# Patient Record
Sex: MALE | Race: WHITE | Employment: FULL TIME | URBAN - METROPOLITAN AREA
[De-identification: names, ages, dates, MRNs, and addresses within clinical notes are randomized per-mention and may not be internally consistent; named-entity substitution may affect disease eponyms.]

---

## 2017-01-18 ENCOUNTER — OFFICE VISIT (OUTPATIENT)
Dept: PAIN MANAGEMENT | Age: 42
End: 2017-01-18

## 2017-01-18 VITALS
DIASTOLIC BLOOD PRESSURE: 76 MMHG | HEART RATE: 81 BPM | WEIGHT: 215 LBS | SYSTOLIC BLOOD PRESSURE: 127 MMHG | BODY MASS INDEX: 28.37 KG/M2

## 2017-01-18 DIAGNOSIS — M75.40 IMPINGEMENT SYNDROME OF SHOULDER, UNSPECIFIED LATERALITY: ICD-10-CM

## 2017-01-18 DIAGNOSIS — S13.9XXA SPRAIN OF NECK, INITIAL ENCOUNTER: ICD-10-CM

## 2017-01-18 LAB
A/G RATIO: 1.4 (ref 1.1–2.2)
ALBUMIN SERPL-MCNC: 4.5 G/DL (ref 3.4–5)
ALP BLD-CCNC: 92 U/L (ref 40–129)
ALT SERPL-CCNC: 33 U/L (ref 10–40)
ANION GAP SERPL CALCULATED.3IONS-SCNC: 12 MMOL/L (ref 3–16)
AST SERPL-CCNC: 29 U/L (ref 15–37)
BILIRUB SERPL-MCNC: 0.4 MG/DL (ref 0–1)
BUN BLDV-MCNC: 12 MG/DL (ref 7–20)
CALCIUM SERPL-MCNC: 9.3 MG/DL (ref 8.3–10.6)
CHLORIDE BLD-SCNC: 99 MMOL/L (ref 99–110)
CO2: 28 MMOL/L (ref 21–32)
CREAT SERPL-MCNC: 0.9 MG/DL (ref 0.9–1.3)
GFR AFRICAN AMERICAN: >60
GFR NON-AFRICAN AMERICAN: >60
GLOBULIN: 3.3 G/DL
GLUCOSE BLD-MCNC: 126 MG/DL (ref 70–99)
HCT VFR BLD CALC: 44.7 % (ref 40.5–52.5)
HEMOGLOBIN: 15.3 G/DL (ref 13.5–17.5)
MCH RBC QN AUTO: 31.1 PG (ref 26–34)
MCHC RBC AUTO-ENTMCNC: 34.3 G/DL (ref 31–36)
MCV RBC AUTO: 90.8 FL (ref 80–100)
PDW BLD-RTO: 13 % (ref 12.4–15.4)
PLATELET # BLD: 327 K/UL (ref 135–450)
PMV BLD AUTO: 8 FL (ref 5–10.5)
POTASSIUM SERPL-SCNC: 4.3 MMOL/L (ref 3.5–5.1)
RBC # BLD: 4.92 M/UL (ref 4.2–5.9)
SODIUM BLD-SCNC: 139 MMOL/L (ref 136–145)
TOTAL PROTEIN: 7.8 G/DL (ref 6.4–8.2)
WBC # BLD: 8.1 K/UL (ref 4–11)

## 2017-01-18 PROCEDURE — 99213 OFFICE O/P EST LOW 20 MIN: CPT | Performed by: INTERNAL MEDICINE

## 2017-01-18 RX ORDER — HYDROCODONE BITARTRATE AND ACETAMINOPHEN 5; 300 MG/1; MG/1
TABLET ORAL
Qty: 70 TABLET | Refills: 0 | Status: SHIPPED | OUTPATIENT
Start: 2017-01-18 | End: 2017-03-01 | Stop reason: SDUPTHER

## 2017-03-01 ENCOUNTER — OFFICE VISIT (OUTPATIENT)
Dept: PAIN MANAGEMENT | Age: 42
End: 2017-03-01

## 2017-03-01 VITALS
SYSTOLIC BLOOD PRESSURE: 124 MMHG | HEART RATE: 71 BPM | WEIGHT: 207 LBS | BODY MASS INDEX: 27.31 KG/M2 | DIASTOLIC BLOOD PRESSURE: 86 MMHG

## 2017-03-01 DIAGNOSIS — S13.9XXA SPRAIN OF NECK, INITIAL ENCOUNTER: ICD-10-CM

## 2017-03-01 DIAGNOSIS — M75.40 IMPINGEMENT SYNDROME OF SHOULDER, UNSPECIFIED LATERALITY: ICD-10-CM

## 2017-03-01 PROCEDURE — 99213 OFFICE O/P EST LOW 20 MIN: CPT | Performed by: INTERNAL MEDICINE

## 2017-03-01 RX ORDER — HYDROCODONE BITARTRATE AND ACETAMINOPHEN 5; 300 MG/1; MG/1
TABLET ORAL
Qty: 70 TABLET | Refills: 0 | Status: SHIPPED | OUTPATIENT
Start: 2017-03-01 | End: 2017-04-12 | Stop reason: SDUPTHER

## 2017-04-12 ENCOUNTER — OFFICE VISIT (OUTPATIENT)
Dept: PAIN MANAGEMENT | Age: 42
End: 2017-04-12

## 2017-04-12 VITALS
SYSTOLIC BLOOD PRESSURE: 112 MMHG | HEART RATE: 82 BPM | BODY MASS INDEX: 28.42 KG/M2 | WEIGHT: 215.4 LBS | DIASTOLIC BLOOD PRESSURE: 77 MMHG

## 2017-04-12 DIAGNOSIS — M75.40 IMPINGEMENT SYNDROME OF SHOULDER, UNSPECIFIED LATERALITY: ICD-10-CM

## 2017-04-12 DIAGNOSIS — S13.9XXA SPRAIN OF NECK, INITIAL ENCOUNTER: ICD-10-CM

## 2017-04-12 PROCEDURE — 99213 OFFICE O/P EST LOW 20 MIN: CPT | Performed by: INTERNAL MEDICINE

## 2017-04-12 RX ORDER — HYDROCODONE BITARTRATE AND ACETAMINOPHEN 5; 300 MG/1; MG/1
TABLET ORAL
Qty: 70 TABLET | Refills: 0 | Status: SHIPPED | OUTPATIENT
Start: 2017-04-12 | End: 2017-05-31 | Stop reason: SDUPTHER

## 2017-04-20 ENCOUNTER — TELEPHONE (OUTPATIENT)
Dept: PAIN MANAGEMENT | Age: 42
End: 2017-04-20

## 2017-05-31 ENCOUNTER — OFFICE VISIT (OUTPATIENT)
Dept: PAIN MANAGEMENT | Age: 42
End: 2017-05-31

## 2017-05-31 VITALS
DIASTOLIC BLOOD PRESSURE: 91 MMHG | HEART RATE: 83 BPM | WEIGHT: 219 LBS | SYSTOLIC BLOOD PRESSURE: 135 MMHG | BODY MASS INDEX: 28.89 KG/M2

## 2017-05-31 DIAGNOSIS — S13.9XXA SPRAIN OF NECK, INITIAL ENCOUNTER: ICD-10-CM

## 2017-05-31 DIAGNOSIS — M75.40 IMPINGEMENT SYNDROME OF SHOULDER, UNSPECIFIED LATERALITY: ICD-10-CM

## 2017-05-31 PROCEDURE — 99213 OFFICE O/P EST LOW 20 MIN: CPT | Performed by: INTERNAL MEDICINE

## 2017-05-31 RX ORDER — HYDROCODONE BITARTRATE AND ACETAMINOPHEN 5; 300 MG/1; MG/1
TABLET ORAL
Qty: 70 TABLET | Refills: 0 | Status: SHIPPED | OUTPATIENT
Start: 2017-05-31 | End: 2017-07-19 | Stop reason: SDUPTHER

## 2017-07-19 ENCOUNTER — OFFICE VISIT (OUTPATIENT)
Dept: PAIN MANAGEMENT | Age: 42
End: 2017-07-19

## 2017-07-19 VITALS
DIASTOLIC BLOOD PRESSURE: 81 MMHG | WEIGHT: 220 LBS | SYSTOLIC BLOOD PRESSURE: 114 MMHG | BODY MASS INDEX: 29.03 KG/M2 | HEART RATE: 88 BPM

## 2017-07-19 DIAGNOSIS — M75.40 IMPINGEMENT SYNDROME OF SHOULDER, UNSPECIFIED LATERALITY: ICD-10-CM

## 2017-07-19 DIAGNOSIS — S13.9XXA SPRAIN OF NECK, INITIAL ENCOUNTER: ICD-10-CM

## 2017-07-19 PROCEDURE — 99213 OFFICE O/P EST LOW 20 MIN: CPT | Performed by: INTERNAL MEDICINE

## 2017-07-19 RX ORDER — HYDROCODONE BITARTRATE AND ACETAMINOPHEN 5; 300 MG/1; MG/1
TABLET ORAL
Qty: 70 TABLET | Refills: 0 | Status: SHIPPED | OUTPATIENT
Start: 2017-07-19 | End: 2017-09-06 | Stop reason: SDUPTHER

## 2017-07-21 ENCOUNTER — TELEPHONE (OUTPATIENT)
Dept: PAIN MANAGEMENT | Age: 42
End: 2017-07-21

## 2017-09-06 ENCOUNTER — OFFICE VISIT (OUTPATIENT)
Dept: PAIN MANAGEMENT | Age: 42
End: 2017-09-06

## 2017-09-06 VITALS
WEIGHT: 224 LBS | BODY MASS INDEX: 29.55 KG/M2 | HEART RATE: 73 BPM | DIASTOLIC BLOOD PRESSURE: 75 MMHG | SYSTOLIC BLOOD PRESSURE: 114 MMHG

## 2017-09-06 DIAGNOSIS — M75.40 IMPINGEMENT SYNDROME OF SHOULDER, UNSPECIFIED LATERALITY: ICD-10-CM

## 2017-09-06 DIAGNOSIS — S13.9XXA SPRAIN OF NECK, INITIAL ENCOUNTER: ICD-10-CM

## 2017-09-06 PROCEDURE — 99213 OFFICE O/P EST LOW 20 MIN: CPT | Performed by: INTERNAL MEDICINE

## 2017-09-06 RX ORDER — HYDROCODONE BITARTRATE AND ACETAMINOPHEN 5; 300 MG/1; MG/1
TABLET ORAL
Qty: 70 TABLET | Refills: 0 | Status: SHIPPED | OUTPATIENT
Start: 2017-09-06 | End: 2017-10-25 | Stop reason: SDUPTHER

## 2017-10-25 ENCOUNTER — OFFICE VISIT (OUTPATIENT)
Dept: PAIN MANAGEMENT | Age: 42
End: 2017-10-25

## 2017-10-25 VITALS
WEIGHT: 224 LBS | BODY MASS INDEX: 29.55 KG/M2 | HEART RATE: 72 BPM | SYSTOLIC BLOOD PRESSURE: 123 MMHG | DIASTOLIC BLOOD PRESSURE: 84 MMHG

## 2017-10-25 DIAGNOSIS — M75.40 IMPINGEMENT SYNDROME OF SHOULDER, UNSPECIFIED LATERALITY: ICD-10-CM

## 2017-10-25 DIAGNOSIS — S13.9XXA SPRAIN OF NECK, INITIAL ENCOUNTER: ICD-10-CM

## 2017-10-25 PROCEDURE — 99213 OFFICE O/P EST LOW 20 MIN: CPT | Performed by: INTERNAL MEDICINE

## 2017-10-25 RX ORDER — HYDROCODONE BITARTRATE AND ACETAMINOPHEN 5; 300 MG/1; MG/1
TABLET ORAL
Qty: 70 TABLET | Refills: 0 | Status: SHIPPED | OUTPATIENT
Start: 2017-10-25 | End: 2017-12-13 | Stop reason: SDUPTHER

## 2017-10-25 NOTE — PROGRESS NOTES
Jono Paredes   1975  [id number]    HISTORY OF PRESENT ILLNESS:  Mr. Shankar Downs is a 38 y.o. male returns for a follow up visit for multiple medical problems. His current presenting problems are   1. [facility] Sprain of neck, initial encounter    2. [facility] Impingement syndrome of shoulder, unspecified laterality    . As per information/history obtained from the PADT(patient assessment and documentation tool) - He complains of pain in the neck, upper back, mid back and lower back with radiation to the shoulders Right, arms Right, elbows Right and buttocks He rates the pain 6/10 and describes it as sharp, aching, numbness. Pain is made worse by: movement, walking, standing, bending, lifting. Current treatment regimen has helped relieve about 80% of the pain. He denies side effects from the current pain regimen. Patient reports that since the last follow up visit the physical functioning is better, family/social relationships are unchanged, mood is unchanged and sleep patterns are unchanged, and that the overall functioning is better. Patient denies neurological bowel or bladder. Patient denies misusing/abusing his narcotic pain medications or using any illegal drugs. There are No indicators for possible drug abuse, addiction or diversion problems. Upon obtaining the medical history from Mr. Shankar Downs regarding today's office visit for his presenting problems, Patient states his pain has been baseline. He states it hurts while doing his work, has a rough job. He says his neck and shoulder hurts the most. Mr. Shankar Downs mentions using Xodol PRN. Patient denies any constipation symptoms. ALLERGIES: Patients list of allergies were reviewed     MEDICATIONS: Mr. Shankar Downs list of medications were reviewed. His current medications are   Outpatient Medications Prior to Visit   Medication Sig Dispense Refill    HYDROcodone-acetaminophen (XODOL) 5-300 MG TABS Take  1 tablet Po Q6H max 1-2 per day.  70 tablet 0    esomeprazole (NEXIUM) 40 MG capsule Take 40 mg by mouth every morning (before breakfast). No facility-administered medications prior to visit. SOCIAL/FAMILY/PAST MEDICAL HISTORY: Mr. Caridad oJe, family and past medical history was reviewed. REVIEW OF SYSTEMS:    Respiratory: Negative for apnea, chest tightness and shortness of breath or change in baseline breathing. Gastrointestinal: Negative for nausea, vomiting, abdominal pain, diarrhea, constipation, blood in stool and abdominal distention. PHYSICAL EXAM:   Nursing note and vitals reviewed. /84 (Site: Left Arm, Position: Sitting)   Pulse 72   Wt 224 lb (101.6 kg)   BMI 29.55 kg/m²   Constitutional: He appears well-developed and well-nourished. No acute distress. Skin: Skin is warm and dry, good turgor. No rash noted. He is not diaphoretic. Cardiovascular: Normal rate, regular rhythm, normal heart sounds, and does not have murmur. Pulmonary/Chest: Effort normal. No respiratory distress. He does not have wheezes in the lung fields. He has no rales. Neurological/Psychiatric:He is alert and oriented to person, place, and time. Coordination is  normal. His mood isAppropriate and affect is Flat/blunted . IMPRESSION:   1. [facility] Sprain of neck, initial encounter    2. [facility] Impingement syndrome of shoulder, unspecified laterality        PLAN:  Informed verbal consent was obtained  -Continue with current regimen   -ROM/stretching exercises for back   -He was advised to increase fluids ( 5-7  glasses of fluid daily), limit caffeine, avoid cheese products, increase dietary fiber, increase activity and exercise as tolerated and relax regularly and enjoy meals  -Neck postural exercises as advised   -Using Robaxin very seldom      Current Outpatient Prescriptions   Medication Sig Dispense Refill    HYDROcodone-acetaminophen (XODOL) 5-300 MG TABS Take  1 tablet Po Q6H max 1-2 per day.  70 tablet 0    esomeprazole (NEXIUM) 40 MG capsule been some errors made inadvertently. Thank you for allowing me to participate in the care of this patient. Michae Halsted, MD.    Cc: Antonio Horton am scribing for and in the presence of Dr. Michae Halsted.    10/25/17  1:41 PM  ELIZABETH Samano, Dr. Michae Halsted, personally performed the services described in this documentation as scribed by   Geeta Stephens MA in my presence and it is both accurate and complete

## 2017-12-13 ENCOUNTER — OFFICE VISIT (OUTPATIENT)
Dept: PAIN MANAGEMENT | Age: 42
End: 2017-12-13

## 2017-12-13 VITALS
WEIGHT: 224 LBS | DIASTOLIC BLOOD PRESSURE: 90 MMHG | BODY MASS INDEX: 29.55 KG/M2 | SYSTOLIC BLOOD PRESSURE: 139 MMHG | HEART RATE: 88 BPM

## 2017-12-13 DIAGNOSIS — S13.9XXA NECK SPRAIN, INITIAL ENCOUNTER: ICD-10-CM

## 2017-12-13 DIAGNOSIS — S13.9XXA SPRAIN OF NECK, INITIAL ENCOUNTER: ICD-10-CM

## 2017-12-13 DIAGNOSIS — M75.40 IMPINGEMENT SYNDROME OF SHOULDER, UNSPECIFIED LATERALITY: ICD-10-CM

## 2017-12-13 DIAGNOSIS — M75.40 IMPINGEMENT SYNDROME OF SHOULDER REGION, UNSPECIFIED LATERALITY: ICD-10-CM

## 2017-12-13 PROCEDURE — 99213 OFFICE O/P EST LOW 20 MIN: CPT | Performed by: INTERNAL MEDICINE

## 2017-12-13 RX ORDER — HYDROCODONE BITARTRATE AND ACETAMINOPHEN 5; 300 MG/1; MG/1
TABLET ORAL
Qty: 70 TABLET | Refills: 0 | Status: SHIPPED | OUTPATIENT
Start: 2017-12-13 | End: 2018-01-31 | Stop reason: SDUPTHER

## 2017-12-13 NOTE — PROGRESS NOTES
Tila Hernández   1975  [id number]    HISTORY OF PRESENT ILLNESS:  Mr. Iris Sidhu is a 38 y.o. male returns for a follow up visit for multiple medical problems. His current presenting problems are   1. Neck sprain, initial encounter    2. Impingement syndrome of shoulder region, unspecified laterality    . As per information/history obtained from the PADT(patient assessment and documentation tool) - He complains of pain in the neck, upper back, mid back and lower back with radiation to the shoulders Right and arms Right He rates the pain 6/10 and describes it as sharp, aching, burning, numbness. Pain is made worse by: movement, walking, standing, sitting, bending, lifting. Current treatment regimen has helped relieve about 70% of the pain. He denies side effects from the current pain regimen. Patient reports that since the last follow up visit the physical functioning is better, family/social relationships are unchanged, mood is unchanged and sleep patterns are unchanged, and that the overall functioning is better. Patient denies neurological bowel or bladder. Patient denies misusing/abusing his narcotic pain medications or using any illegal drugs. There are No indicators for possible drug abuse, addiction or diversion problems. Upon obtaining the medical history from Mr. Iris Sidhu regarding today's office visit for his presenting problems, Patient states his pain has been tolerable with the medications. He says he has been working full time. He mentions he is using Xodol as needed. He reports he has not been using much of the Robaxin, has not used it the last 2 years. He complains his neck hurts the most.     ALLERGIES: Patients list of allergies were reviewed     MEDICATIONS: Mr. rIis Sidhu list of medications were reviewed. His current medications are   Outpatient Medications Prior to Visit   Medication Sig Dispense Refill    HYDROcodone-acetaminophen (XODOL) 5-300 MG TABS Take  1 tablet Po Q6H max 1-2 per day. 70 tablet 0    esomeprazole (NEXIUM) 40 MG capsule Take 40 mg by mouth every morning (before breakfast). No facility-administered medications prior to visit. SOCIAL/FAMILY/PAST MEDICAL HISTORY: Mr. Charmaine Thurman, family and past medical history was reviewed. REVIEW OF SYSTEMS:    Respiratory: Negative for apnea, chest tightness and shortness of breath or change in baseline breathing. Gastrointestinal: Negative for nausea, vomiting, abdominal pain, diarrhea, constipation, blood in stool and abdominal distention. PHYSICAL EXAM:   Nursing note and vitals reviewed. BP (!) 139/90   Pulse 88   Wt 224 lb (101.6 kg)   BMI 29.55 kg/m²   Constitutional: He appears well-developed and well-nourished. No acute distress. Skin: Skin is warm and dry, good turgor. No rash noted. He is not diaphoretic. Cardiovascular: Normal rate, regular rhythm, normal heart sounds, and does not have murmur. Pulmonary/Chest: Effort normal. No respiratory distress. He does not have wheezes in the lung fields. He has no rales. Neurological/Psychiatric:He is alert and oriented to person, place, and time. Coordination is  normal. His mood isAppropriate and affect is Flat/blunted . IMPRESSION:   1. Neck sprain, initial encounter    2. Impingement syndrome of shoulder region, unspecified laterality        PLAN:  Informed verbal consent was obtained  -[place] was obtained and reviewed  for the last one year and no indicators of drug misuse  were found. Any other controlled substance prescriptions  seen on the record have been accounted for, I am aware of the patient receiving these medications. Mariel Altamirano  [place] record will be rechecked as part of office protocol.    -Continue with Norco as needed 1-2 per day   -Discontinue with Robaxin, not using it   -He was advised to increase fluids ( 5-7  glasses of fluid daily), limit caffeine, avoid cheese products, increase dietary fiber, increase activity and exercise as tolerated and relax regularly and enjoy meals   -Neck postural exercises as advised   Current Outpatient Prescriptions   Medication Sig Dispense Refill    HYDROcodone-acetaminophen (XODOL) 5-300 MG TABS Take  1 tablet Po Q6H max 1-2 per day. 70 tablet 0    esomeprazole (NEXIUM) 40 MG capsule Take 40 mg by mouth every morning (before breakfast). No current facility-administered medications for this visit. I will continue his current medication regimen  which is part of the above treatment schedule. It has been helping with Mr. Lanette Styles chronic  medical problems which for this visit include:   Diagnoses of Neck sprain, initial encounter and Impingement syndrome of shoulder region, unspecified laterality were pertinent to this visit. Risks and benefits of the medications and other alternative treatments  including no treatment were discussed with the patient. The common side effects of these medications were also explained to the patient. Informed verbal consent was obtained. Goals of current treatment regimen include improvement in pain, restoration of functioning- with focus on improvement in physical performance, general activity, work or disability,emotional distress, health care utilization and  decreased medication consumption. Will continue to monitor progress towards achieving/maintaining therapeutic goals with special emphasis on  1. Improvement in perceived interfernce  of pain with ADL's. Ability to do home exercises independently. Ability to do household chores indoor and/or outdoor work and social and leisure activities. Improve psychosocial and physical functioning. - he is showing progression towards this treatment goal with the current regimen. 2. Ability to focus/concentrate at work and perform the duties required of him at work  Sit through a workday without lower extremity symptoms. Stand 30-60 minutes without lower extremity symptoms. Ability to lift up to 10-20 lbs.  Ability to go up and down stairs. Sit 30-60 minutes  Without having to stand up frequently. - he is maintaining/progressing towards his work related goals with the current regimen. He was advised against drinking alcohol with the narcotic pain medicines, advised against driving or handling machinery while adjusting the dose of medicines or if having cognitive  issues related to the current medications. Risk of overdose and death, if medicines not taken as prescribed, were also discussed. If the patient develops new symptoms or if the symptoms worsen, the patient should call the office. While transcribing every attempt was made to maintain the accuracy of the note in terms of it's contents,there may have been some errors made inadvertently. Thank you for allowing me to participate in the care of this patient. Elijah Rodriguez MD.    Cc: Luly Johnson DO    I, Denise Goff am scribing for and in the presence of Dr. Elijah Rodriguez.    12/13/17  1:40 PM  Denise Goff MA   I, Dr. Elijah Rodriguez, personally performed the services described in this documentation as scribed by   Denise Goff MA in my presence and it is both accurate and complete

## 2018-01-31 ENCOUNTER — OFFICE VISIT (OUTPATIENT)
Dept: PAIN MANAGEMENT | Age: 43
End: 2018-01-31

## 2018-01-31 VITALS
BODY MASS INDEX: 28.37 KG/M2 | SYSTOLIC BLOOD PRESSURE: 127 MMHG | HEART RATE: 81 BPM | DIASTOLIC BLOOD PRESSURE: 81 MMHG | WEIGHT: 215 LBS

## 2018-01-31 DIAGNOSIS — M75.40 IMPINGEMENT SYNDROME OF SHOULDER, UNSPECIFIED LATERALITY: ICD-10-CM

## 2018-01-31 DIAGNOSIS — S13.9XXA SPRAIN OF NECK, INITIAL ENCOUNTER: ICD-10-CM

## 2018-01-31 PROCEDURE — 99213 OFFICE O/P EST LOW 20 MIN: CPT | Performed by: INTERNAL MEDICINE

## 2018-01-31 RX ORDER — HYDROCODONE BITARTRATE AND ACETAMINOPHEN 5; 300 MG/1; MG/1
TABLET ORAL
Qty: 70 TABLET | Refills: 0 | Status: SHIPPED | OUTPATIENT
Start: 2018-01-31 | End: 2018-03-21 | Stop reason: SDUPTHER

## 2018-01-31 NOTE — PROGRESS NOTES
per day. 70 tablet 0    esomeprazole (NEXIUM) 40 MG capsule Take 40 mg by mouth every morning (before breakfast). No facility-administered medications prior to visit. SOCIAL/FAMILY/PAST MEDICAL HISTORY: Mr. Amalia Rouse, family and past medical history was reviewed. REVIEW OF SYSTEMS:    Respiratory: Negative for apnea, chest tightness and shortness of breath or change in baseline breathing. Gastrointestinal: Negative for nausea, vomiting, abdominal pain, diarrhea, constipation, blood in stool and abdominal distention. PHYSICAL EXAM:   Nursing note and vitals reviewed. /81   Pulse 81   Wt 215 lb (97.5 kg)   BMI 28.37 kg/m²   Constitutional: He appears well-developed and well-nourished. No acute distress. Skin: Skin is warm and dry, good turgor. No rash noted. He is not diaphoretic. Cardiovascular: Normal rate, regular rhythm, normal heart sounds, and does not have murmur. Pulmonary/Chest: Effort normal. No respiratory distress. He does not have wheezes in the lung fields. He has no rales. Neurological/Psychiatric:He is alert and oriented to person, place, and time. Coordination is  normal. His mood isAppropriate and affect is Neutral/Euthymic(normal) . IMPRESSION:   1. [facility] Sprain of neck, initial encounter    2. [facility] Impingement syndrome of shoulder, unspecified laterality        PLAN:  Informed verbal consent was obtained  -Continue with current regimen   -ROM/stretching exercises   -Continue with Xoldol 1-2 per day   -Neck exercises as advised   -Can use OTC Nsaids. Current Outpatient Prescriptions   Medication Sig Dispense Refill    HYDROcodone-acetaminophen (XODOL) 5-300 MG TABS Take  1 tablet Po Q6H max 1-2 per day. 70 tablet 0    esomeprazole (NEXIUM) 40 MG capsule Take 40 mg by mouth every morning (before breakfast). No current facility-administered medications for this visit.       I will continue his current medication regimen  which is part of the above treatment schedule. It has been helping with Mr. Tanvi Berry chronic  medical problems which for this visit include:   Diagnoses of [facility] Sprain of neck, initial encounter and [facility] Impingement syndrome of shoulder, unspecified laterality were pertinent to this visit. Risks and benefits of the medications and other alternative treatments  including no treatment were discussed with the patient. The common side effects of these medications were also explained to the patient. Informed verbal consent was obtained. Goals of current treatment regimen include improvement in pain, restoration of functioning- with focus on improvement in physical performance, general activity, work or disability,emotional distress, health care utilization and  decreased medication consumption. Will continue to monitor progress towards achieving/maintaining therapeutic goals with special emphasis on  1. Improvement in perceived interfernce  of pain with ADL's. Ability to do home exercises independently. Ability to do household chores indoor and/or outdoor work and social and leisure activities. Improve psychosocial and physical functioning. - he is showing progression towards this treatment goal with the current regimen. He was advised against drinking alcohol with the narcotic pain medicines, advised against driving or handling machinery while adjusting the dose of medicines or if having cognitive  issues related to the current medications. Risk of overdose and death, if medicines not taken as prescribed, were also discussed. If the patient develops new symptoms or if the symptoms worsen, the patient should call the office. While transcribing every attempt was made to maintain the accuracy of the note in terms of it's contents,there may have been some errors made inadvertently. Thank you for allowing me to participate in the care of this patient.     Damion Boo MD.    Cc: Kenn Eisenmenger, DO    I, Mj Tran, am

## 2018-03-12 ENCOUNTER — OFFICE VISIT (OUTPATIENT)
Dept: FAMILY MEDICINE CLINIC | Age: 43
End: 2018-03-12

## 2018-03-12 VITALS
WEIGHT: 227.2 LBS | BODY MASS INDEX: 30.11 KG/M2 | HEIGHT: 73 IN | DIASTOLIC BLOOD PRESSURE: 76 MMHG | SYSTOLIC BLOOD PRESSURE: 116 MMHG | TEMPERATURE: 97.4 F

## 2018-03-12 DIAGNOSIS — M72.2 PLANTAR FASCIITIS: ICD-10-CM

## 2018-03-12 PROCEDURE — 99213 OFFICE O/P EST LOW 20 MIN: CPT | Performed by: INTERNAL MEDICINE

## 2018-03-12 RX ORDER — METHYLPREDNISOLONE 4 MG/1
TABLET ORAL
Qty: 1 KIT | Refills: 0 | Status: SHIPPED | OUTPATIENT
Start: 2018-03-12 | End: 2018-05-09

## 2018-03-12 ASSESSMENT — PATIENT HEALTH QUESTIONNAIRE - PHQ9
SUM OF ALL RESPONSES TO PHQ QUESTIONS 1-9: 0
2. FEELING DOWN, DEPRESSED OR HOPELESS: 0
1. LITTLE INTEREST OR PLEASURE IN DOING THINGS: 0
SUM OF ALL RESPONSES TO PHQ9 QUESTIONS 1 & 2: 0

## 2018-03-12 ASSESSMENT — ENCOUNTER SYMPTOMS
COUGH: 0
SHORTNESS OF BREATH: 0
APNEA: 0
ABDOMINAL PAIN: 0

## 2018-03-12 NOTE — PROGRESS NOTES
Smoking status: Former Smoker    Smokeless tobacco: Former User     Types: Chew     Quit date: 7/9/2009    Alcohol use No       Review of Systems   Constitutional: Negative for chills and diaphoresis. Eyes: Negative for visual disturbance. Respiratory: Negative for apnea, cough and shortness of breath. Cardiovascular: Negative for chest pain. Gastrointestinal: Negative for abdominal pain. Musculoskeletal:        Patient presents with: Foot Pain: patient c/o right heel pain since yesterday. patient denies injury and is currently taking Xodol from pain management         Objective:   Physical Exam   Constitutional: He is oriented to person, place, and time. He appears well-developed and well-nourished. HENT:   Head: Normocephalic and atraumatic. Eyes: Conjunctivae are normal. Pupils are equal, round, and reactive to light. Neck: No tracheal deviation present. No thyromegaly present. Cardiovascular: Normal rate. No murmur heard. Pulmonary/Chest: Effort normal and breath sounds normal. No respiratory distress. He has no wheezes. He has no rales. Abdominal: He exhibits no distension. There is no tenderness. There is no rebound. Musculoskeletal: He exhibits no edema. Plantar fascitis   Neurological: He is alert and oriented to person, place, and time. Skin: No rash noted. No erythema. Assessment:      1. Plantar fasciitis           Plan:      Outpatient Encounter Prescriptions as of 3/12/2018   Medication Sig Dispense Refill    methylPREDNISolone (MEDROL, CARMINE,) 4 MG tablet Take by mouth. 1 kit 0    HYDROcodone-acetaminophen (XODOL) 5-300 MG TABS Take  1 tablet Po Q6H max 1-2 per day. 70 tablet 0    esomeprazole (NEXIUM) 40 MG capsule Take 40 mg by mouth every morning (before breakfast). No facility-administered encounter medications on file as of 3/12/2018. No orders of the defined types were placed in this encounter.

## 2018-03-21 ENCOUNTER — OFFICE VISIT (OUTPATIENT)
Dept: PAIN MANAGEMENT | Age: 43
End: 2018-03-21

## 2018-03-21 VITALS
BODY MASS INDEX: 29.09 KG/M2 | SYSTOLIC BLOOD PRESSURE: 126 MMHG | WEIGHT: 220.5 LBS | DIASTOLIC BLOOD PRESSURE: 92 MMHG | HEART RATE: 87 BPM

## 2018-03-21 DIAGNOSIS — M75.40 IMPINGEMENT SYNDROME OF SHOULDER REGION, UNSPECIFIED LATERALITY: ICD-10-CM

## 2018-03-21 DIAGNOSIS — S13.9XXA SPRAIN OF NECK, INITIAL ENCOUNTER: ICD-10-CM

## 2018-03-21 DIAGNOSIS — S13.9XXA NECK SPRAIN, INITIAL ENCOUNTER: ICD-10-CM

## 2018-03-21 DIAGNOSIS — M75.40 IMPINGEMENT SYNDROME OF SHOULDER, UNSPECIFIED LATERALITY: ICD-10-CM

## 2018-03-21 LAB
A/G RATIO: 1.5 (ref 1.1–2.2)
ALBUMIN SERPL-MCNC: 4.8 G/DL (ref 3.4–5)
ALP BLD-CCNC: 82 U/L (ref 40–129)
ALT SERPL-CCNC: 35 U/L (ref 10–40)
ANION GAP SERPL CALCULATED.3IONS-SCNC: 17 MMOL/L (ref 3–16)
AST SERPL-CCNC: 25 U/L (ref 15–37)
BILIRUB SERPL-MCNC: 0.7 MG/DL (ref 0–1)
BUN BLDV-MCNC: 12 MG/DL (ref 7–20)
CALCIUM SERPL-MCNC: 9.3 MG/DL (ref 8.3–10.6)
CHLORIDE BLD-SCNC: 95 MMOL/L (ref 99–110)
CO2: 25 MMOL/L (ref 21–32)
CREAT SERPL-MCNC: 1 MG/DL (ref 0.9–1.3)
GFR AFRICAN AMERICAN: >60
GFR NON-AFRICAN AMERICAN: >60
GLOBULIN: 3.1 G/DL
GLUCOSE BLD-MCNC: 102 MG/DL (ref 70–99)
HCT VFR BLD CALC: 46.9 % (ref 40.5–52.5)
HEMOGLOBIN: 16.4 G/DL (ref 13.5–17.5)
MCH RBC QN AUTO: 31.4 PG (ref 26–34)
MCHC RBC AUTO-ENTMCNC: 35 G/DL (ref 31–36)
MCV RBC AUTO: 89.6 FL (ref 80–100)
PDW BLD-RTO: 12.8 % (ref 12.4–15.4)
PLATELET # BLD: 361 K/UL (ref 135–450)
PMV BLD AUTO: 7.7 FL (ref 5–10.5)
POTASSIUM SERPL-SCNC: 4.2 MMOL/L (ref 3.5–5.1)
RBC # BLD: 5.23 M/UL (ref 4.2–5.9)
SODIUM BLD-SCNC: 137 MMOL/L (ref 136–145)
TOTAL PROTEIN: 7.9 G/DL (ref 6.4–8.2)
WBC # BLD: 8.5 K/UL (ref 4–11)

## 2018-03-21 PROCEDURE — 99213 OFFICE O/P EST LOW 20 MIN: CPT | Performed by: INTERNAL MEDICINE

## 2018-03-21 RX ORDER — HYDROCODONE BITARTRATE AND ACETAMINOPHEN 5; 300 MG/1; MG/1
TABLET ORAL
Qty: 70 TABLET | Refills: 0 | Status: SHIPPED | OUTPATIENT
Start: 2018-03-21 | End: 2018-05-09 | Stop reason: SDUPTHER

## 2018-03-21 NOTE — PROGRESS NOTES
[facility]  1975  [id number]    HISTORY OF PRESENT ILLNESS:  Mr. Kira Ramos is a 38 y.o. male returns for a follow up visit for multiple medical problems. His current presenting problems are   1. Neck sprain, initial encounter    2. Impingement syndrome of shoulder region, unspecified laterality    . As per information/history obtained from the PADT(patient assessment and documentation tool) - He complains of pain in the neck, upper back, mid back and lower back with radiation to the shoulders Right, arms Right and elbows Right He rates the pain 6/10 and describes it as aching, numbness. Pain is made worse by: movement, walking, standing, bending, lifting. Current treatment regimen has helped relieve about 80% of the pain. He denies side effects from the current pain regimen. Patient reports that since the last follow up visit the physical functioning is better, family/social relationships are better, mood is unchanged and sleep patterns are unchanged, and that the overall functioning is unchanged. Patient denies neurological bowel or bladder. Patient denies misusing/abusing his narcotic pain medications or using any illegal drugs. There are No indicators for possible drug abuse, addiction or diversion problems. Upon obtaining the medical history from Mr. Kira Ramos regarding today's office visit for his presenting problems, Mr. Kira Ramos states he is doing fair. He mentions he is working full time. He says he had a steroid pack for pain in the foot and sinuitis. He denies any constipation symptoms. He reports his shoulder and neck are baseline,but it always hurts. ALLERGIES: Patients list of allergies were reviewed     MEDICATIONS: Mr. Kira Ramos list of medications were reviewed. His current medications are   Outpatient Medications Prior to Visit   Medication Sig Dispense Refill    methylPREDNISolone (MEDROL, CARMINE,) 4 MG tablet Take by mouth.  1 kit 0    esomeprazole (NEXIUM) 40 MG capsule Take 40 methylPREDNISolone (MEDROL, CARMINE,) 4 MG tablet Take by mouth. 1 kit 0    esomeprazole (NEXIUM) 40 MG capsule Take 40 mg by mouth every morning (before breakfast). No current facility-administered medications for this visit. I will continue his current medication regimen  which is part of the above treatment schedule. It has been helping with Mr. Ravin Garzon chronic  medical problems which for this visit include:   Diagnoses of Neck sprain, initial encounter and Impingement syndrome of shoulder region, unspecified laterality were pertinent to this visit. Risks and benefits of the medications and other alternative treatments  including no treatment were discussed with the patient. The common side effects of these medications were also explained to the patient. Informed verbal consent was obtained. Goals of current treatment regimen include improvement in pain, restoration of functioning- with focus on improvement in physical performance, general activity, work or disability,emotional distress, health care utilization and  decreased medication consumption. Will continue to monitor progress towards achieving/maintaining therapeutic goals with special emphasis on  1. Improvement in perceived interfernce  of pain with ADL's. Ability to do home exercises independently. Ability to do household chores indoor and/or outdoor work and social and leisure activities. Improve psychosocial and physical functioning. - he is showing progression towards this treatment goal with the current regimen. He was advised against drinking alcohol with the narcotic pain medicines, advised against driving or handling machinery while adjusting the dose of medicines or if having cognitive  issues related to the current medications. Risk of overdose and death, if medicines not taken as prescribed, were also discussed. If the patient develops new symptoms or if the symptoms worsen, the patient should call the office.     While

## 2018-05-09 ENCOUNTER — OFFICE VISIT (OUTPATIENT)
Dept: PAIN MANAGEMENT | Age: 43
End: 2018-05-09

## 2018-05-09 VITALS
HEART RATE: 80 BPM | WEIGHT: 215 LBS | BODY MASS INDEX: 28.37 KG/M2 | SYSTOLIC BLOOD PRESSURE: 119 MMHG | DIASTOLIC BLOOD PRESSURE: 70 MMHG

## 2018-05-09 DIAGNOSIS — S13.9XXA NECK SPRAIN, INITIAL ENCOUNTER: ICD-10-CM

## 2018-05-09 DIAGNOSIS — M75.40 IMPINGEMENT SYNDROME OF SHOULDER, UNSPECIFIED LATERALITY: ICD-10-CM

## 2018-05-09 DIAGNOSIS — S13.9XXA SPRAIN OF NECK, INITIAL ENCOUNTER: ICD-10-CM

## 2018-05-09 DIAGNOSIS — M75.40 IMPINGEMENT SYNDROME OF SHOULDER REGION, UNSPECIFIED LATERALITY: ICD-10-CM

## 2018-05-09 PROCEDURE — 99213 OFFICE O/P EST LOW 20 MIN: CPT | Performed by: INTERNAL MEDICINE

## 2018-05-09 RX ORDER — HYDROCODONE BITARTRATE AND ACETAMINOPHEN 5; 300 MG/1; MG/1
TABLET ORAL
Qty: 70 TABLET | Refills: 0 | Status: SHIPPED | OUTPATIENT
Start: 2018-05-09 | End: 2018-06-20 | Stop reason: SDUPTHER

## 2018-05-21 ENCOUNTER — TELEPHONE (OUTPATIENT)
Dept: PAIN MANAGEMENT | Age: 43
End: 2018-05-21

## 2018-05-21 DIAGNOSIS — M75.40 IMPINGEMENT SYNDROME OF SHOULDER REGION, UNSPECIFIED LATERALITY: ICD-10-CM

## 2018-05-21 DIAGNOSIS — G89.4 CHRONIC PAIN SYNDROME: ICD-10-CM

## 2018-05-21 DIAGNOSIS — S13.9XXA NECK SPRAIN, INITIAL ENCOUNTER: ICD-10-CM

## 2018-06-20 ENCOUNTER — OFFICE VISIT (OUTPATIENT)
Dept: PAIN MANAGEMENT | Age: 43
End: 2018-06-20

## 2018-06-20 VITALS — HEART RATE: 73 BPM | DIASTOLIC BLOOD PRESSURE: 82 MMHG | SYSTOLIC BLOOD PRESSURE: 126 MMHG

## 2018-06-20 DIAGNOSIS — S13.9XXS NECK SPRAIN, SEQUELA: ICD-10-CM

## 2018-06-20 DIAGNOSIS — S13.9XXA SPRAIN OF NECK, INITIAL ENCOUNTER: ICD-10-CM

## 2018-06-20 DIAGNOSIS — S13.9XXA NECK SPRAIN, INITIAL ENCOUNTER: ICD-10-CM

## 2018-06-20 DIAGNOSIS — M75.40 IMPINGEMENT SYNDROME OF SHOULDER REGION, UNSPECIFIED LATERALITY: ICD-10-CM

## 2018-06-20 DIAGNOSIS — M75.40 IMPINGEMENT SYNDROME OF SHOULDER, UNSPECIFIED LATERALITY: ICD-10-CM

## 2018-06-20 PROCEDURE — 99213 OFFICE O/P EST LOW 20 MIN: CPT | Performed by: INTERNAL MEDICINE

## 2018-06-20 RX ORDER — HYDROCODONE BITARTRATE AND ACETAMINOPHEN 5; 300 MG/1; MG/1
TABLET ORAL
Qty: 70 TABLET | Refills: 0 | Status: SHIPPED | OUTPATIENT
Start: 2018-06-20 | End: 2018-08-01 | Stop reason: SDUPTHER

## 2018-07-09 ENCOUNTER — OFFICE VISIT (OUTPATIENT)
Dept: FAMILY MEDICINE CLINIC | Age: 43
End: 2018-07-09

## 2018-07-09 VITALS
WEIGHT: 225 LBS | DIASTOLIC BLOOD PRESSURE: 80 MMHG | SYSTOLIC BLOOD PRESSURE: 110 MMHG | TEMPERATURE: 98.1 F | BODY MASS INDEX: 29.82 KG/M2 | HEIGHT: 73 IN

## 2018-07-09 DIAGNOSIS — Z13.220 SCREENING FOR LIPID DISORDERS: ICD-10-CM

## 2018-07-09 DIAGNOSIS — Z00.00 PHYSICAL EXAM, ANNUAL: Primary | ICD-10-CM

## 2018-07-09 DIAGNOSIS — Z13.1 SCREENING FOR DIABETES MELLITUS: ICD-10-CM

## 2018-07-09 DIAGNOSIS — Z23 NEED FOR TDAP VACCINATION: ICD-10-CM

## 2018-07-09 DIAGNOSIS — R35.0 FREQUENCY OF URINATION: ICD-10-CM

## 2018-07-09 PROCEDURE — 99396 PREV VISIT EST AGE 40-64: CPT | Performed by: INTERNAL MEDICINE

## 2018-07-09 PROCEDURE — 90715 TDAP VACCINE 7 YRS/> IM: CPT | Performed by: INTERNAL MEDICINE

## 2018-07-09 PROCEDURE — 90471 IMMUNIZATION ADMIN: CPT | Performed by: INTERNAL MEDICINE

## 2018-07-09 ASSESSMENT — ENCOUNTER SYMPTOMS
BLOOD IN STOOL: 0
APNEA: 0
SINUS PAIN: 0
WHEEZING: 0
NAUSEA: 0
DIARRHEA: 0
ABDOMINAL PAIN: 0
SINUS PRESSURE: 0
SORE THROAT: 0
RHINORRHEA: 0
SHORTNESS OF BREATH: 0
COUGH: 0
CONSTIPATION: 0

## 2018-07-09 NOTE — PATIENT INSTRUCTIONS
Thank you for choosing [facility]. Please bring a current list of medications to every appointment. Please contact your pharmacy for any prescription refill(s) that you are requesting.

## 2018-07-14 DIAGNOSIS — Z13.220 SCREENING FOR LIPID DISORDERS: ICD-10-CM

## 2018-07-14 DIAGNOSIS — Z13.1 SCREENING FOR DIABETES MELLITUS: ICD-10-CM

## 2018-07-14 DIAGNOSIS — R35.0 FREQUENCY OF URINATION: ICD-10-CM

## 2018-07-14 LAB
ANION GAP SERPL CALCULATED.3IONS-SCNC: 12 MMOL/L (ref 3–16)
BUN BLDV-MCNC: 12 MG/DL (ref 7–20)
CALCIUM SERPL-MCNC: 9.4 MG/DL (ref 8.3–10.6)
CHLORIDE BLD-SCNC: 103 MMOL/L (ref 99–110)
CHOLESTEROL, TOTAL: 194 MG/DL (ref 0–199)
CO2: 27 MMOL/L (ref 21–32)
CREAT SERPL-MCNC: 1 MG/DL (ref 0.9–1.3)
GFR AFRICAN AMERICAN: >60
GFR NON-AFRICAN AMERICAN: >60
GLUCOSE BLD-MCNC: 127 MG/DL (ref 70–99)
HDLC SERPL-MCNC: 30 MG/DL (ref 40–60)
LDL CHOLESTEROL CALCULATED: ABNORMAL MG/DL
LDL CHOLESTEROL DIRECT: 105 MG/DL
POTASSIUM SERPL-SCNC: 4.5 MMOL/L (ref 3.5–5.1)
SODIUM BLD-SCNC: 142 MMOL/L (ref 136–145)
TRIGL SERPL-MCNC: 360 MG/DL (ref 0–150)
VLDLC SERPL CALC-MCNC: ABNORMAL MG/DL

## 2018-08-01 ENCOUNTER — OFFICE VISIT (OUTPATIENT)
Dept: PAIN MANAGEMENT | Age: 43
End: 2018-08-01

## 2018-08-01 VITALS
SYSTOLIC BLOOD PRESSURE: 133 MMHG | DIASTOLIC BLOOD PRESSURE: 89 MMHG | WEIGHT: 225 LBS | HEART RATE: 76 BPM | BODY MASS INDEX: 29.69 KG/M2

## 2018-08-01 DIAGNOSIS — S13.9XXA SPRAIN OF NECK, INITIAL ENCOUNTER: ICD-10-CM

## 2018-08-01 DIAGNOSIS — M75.40 IMPINGEMENT SYNDROME OF SHOULDER, UNSPECIFIED LATERALITY: ICD-10-CM

## 2018-08-01 DIAGNOSIS — S13.9XXA NECK SPRAIN, INITIAL ENCOUNTER: ICD-10-CM

## 2018-08-01 DIAGNOSIS — M75.40 IMPINGEMENT SYNDROME OF SHOULDER REGION, UNSPECIFIED LATERALITY: ICD-10-CM

## 2018-08-01 PROCEDURE — 99213 OFFICE O/P EST LOW 20 MIN: CPT | Performed by: INTERNAL MEDICINE

## 2018-08-01 RX ORDER — HYDROCODONE BITARTRATE AND ACETAMINOPHEN 5; 300 MG/1; MG/1
TABLET ORAL
Qty: 70 TABLET | Refills: 0 | Status: SHIPPED | OUTPATIENT
Start: 2018-08-01 | End: 2018-09-12 | Stop reason: SDUPTHER

## 2018-08-02 ENCOUNTER — TELEPHONE (OUTPATIENT)
Dept: FAMILY MEDICINE CLINIC | Age: 43
End: 2018-08-02

## 2018-08-02 NOTE — TELEPHONE ENCOUNTER
Carolin Grullon, wife on hippisabela, was told that pt's chol was ok but when looking at the results his Triglycerides was 360 and his good chol was 30, she is wanting to make sure that this is correct that the chol is ok.       Pl advise Lila @ [phone]

## 2018-08-08 PROBLEM — Z00.00 PHYSICAL EXAM, ANNUAL: Status: RESOLVED | Noted: 2018-07-09 | Resolved: 2018-08-08

## 2018-09-12 ENCOUNTER — OFFICE VISIT (OUTPATIENT)
Dept: PAIN MANAGEMENT | Age: 43
End: 2018-09-12

## 2018-09-12 VITALS
BODY MASS INDEX: 29.69 KG/M2 | SYSTOLIC BLOOD PRESSURE: 114 MMHG | HEART RATE: 75 BPM | DIASTOLIC BLOOD PRESSURE: 74 MMHG | WEIGHT: 225 LBS

## 2018-09-12 DIAGNOSIS — S13.9XXA SPRAIN OF NECK, INITIAL ENCOUNTER: ICD-10-CM

## 2018-09-12 DIAGNOSIS — M75.40 IMPINGEMENT SYNDROME OF SHOULDER, UNSPECIFIED LATERALITY: ICD-10-CM

## 2018-09-12 PROCEDURE — 99213 OFFICE O/P EST LOW 20 MIN: CPT | Performed by: INTERNAL MEDICINE

## 2018-09-12 RX ORDER — HYDROCODONE BITARTRATE AND ACETAMINOPHEN 5; 300 MG/1; MG/1
1 TABLET ORAL EVERY 6 HOURS PRN
Qty: 75 TABLET | Refills: 0 | Status: SHIPPED | OUTPATIENT
Start: 2018-09-12 | End: 2018-10-31 | Stop reason: SDUPTHER

## 2018-09-12 NOTE — PROGRESS NOTES
per day. 70 tablet 0    esomeprazole (NEXIUM) 40 MG capsule Take 40 mg by mouth every morning (before breakfast). No current facility-administered medications for this visit. I will continue his current medication regimen  which is part of the above treatment schedule. It has been helping with Mr. Karen Arnold chronic  medical problems which for this visit include:   Diagnoses of Neck sprain, subsequent encounter and Impingement syndrome of right shoulder were pertinent to this visit. Risks and benefits of the medications and other alternative treatments  including no treatment were discussed with the patient. The common side effects of these medications were also explained to the patient. Informed verbal consent was obtained. Goals of current treatment regimen include improvement in pain, restoration of functioning- with focus on improvement in physical performance, general activity, work or disability,emotional distress, health care utilization and  decreased medication consumption. Will continue to monitor progress towards achieving/maintaining therapeutic goals with special emphasis on  1. Improvement in perceived interfernce  of pain with ADL's. Ability to do home exercises independently. Ability to do household chores indoor and/or outdoor work and social and leisure activities. Improve psychosocial and physical functioning. - he is showing progression towards this treatment goal with the current regimen    He was advised against drinking alcohol with the narcotic pain medicines, advised against driving or handling machinery while adjusting the dose of medicines or if having cognitive  issues related to the current medications. Risk of overdose and death, if medicines not taken as prescribed, were also discussed. If the patient develops new symptoms or if the symptoms worsen, the patient should call the office.     While transcribing every attempt was made to maintain the accuracy of the note in

## 2018-09-12 NOTE — LETTER
[facility]  [address] [place] [address] [place] [address]  Phone: [phone]  Fax: [phone]    Eber Pino MD        September 12, 2018     Patient: Demetrius Johnson   YOB: 1975   Date of Visit: 9/12/2018       To Whom it May Concern:    Carla Olivas was seen in my clinic on 9/12/2018. He may return to work on 09/13/2018. If you have any questions or concerns, please don't hesitate to call.     Sincerely,         Eber Pino MD

## 2018-10-31 ENCOUNTER — OFFICE VISIT (OUTPATIENT)
Dept: PAIN MANAGEMENT | Age: 43
End: 2018-10-31
Payer: COMMERCIAL

## 2018-10-31 VITALS
DIASTOLIC BLOOD PRESSURE: 81 MMHG | BODY MASS INDEX: 30.48 KG/M2 | WEIGHT: 231 LBS | HEART RATE: 89 BPM | SYSTOLIC BLOOD PRESSURE: 117 MMHG

## 2018-10-31 DIAGNOSIS — M75.40 IMPINGEMENT SYNDROME OF SHOULDER, UNSPECIFIED LATERALITY: ICD-10-CM

## 2018-10-31 DIAGNOSIS — S13.9XXA SPRAIN OF NECK, INITIAL ENCOUNTER: ICD-10-CM

## 2018-10-31 PROCEDURE — 99213 OFFICE O/P EST LOW 20 MIN: CPT | Performed by: INTERNAL MEDICINE

## 2018-10-31 RX ORDER — HYDROCODONE BITARTRATE AND ACETAMINOPHEN 5; 300 MG/1; MG/1
1 TABLET ORAL EVERY 6 HOURS PRN
Qty: 75 TABLET | Refills: 0 | Status: SHIPPED | OUTPATIENT
Start: 2018-10-31 | End: 2018-12-12 | Stop reason: SDUPTHER

## 2018-10-31 NOTE — PROGRESS NOTES
for this visit. I will continue his current medication regimen  which is part of the above treatment schedule. It has been helping with Mr. Bing Burris chronic  medical problems which for this visit include:   Diagnoses of [facility] Sprain of neck, initial encounter and [facility] Impingement syndrome of shoulder, unspecified laterality were pertinent to this visit. Risks and benefits of the medications and other alternative treatments  including no treatment were discussed with the patient. The common side effects of these medications were also explained to the patient. Informed verbal consent was obtained. Goals of current treatment regimen include improvement in pain, restoration of functioning- with focus on improvement in physical performance, general activity, work or disability,emotional distress, health care utilization and  decreased medication consumption. Will continue to monitor progress towards achieving/maintaining therapeutic goals with special emphasis on  1. Improvement in perceived interfernce  of pain with ADL's. Ability to do home exercises independently. Ability to do household chores indoor and/or outdoor work and social and leisure activities. Improve psychosocial and physical functioning. - he is showing progression towards this treatment goal with the current regimen. He was advised against drinking alcohol with the narcotic pain medicines, advised against driving or handling machinery while adjusting the dose of medicines or if having cognitive  issues related to the current medications. Risk of overdose and death, if medicines not taken as prescribed, were also discussed. If the patient develops new symptoms or if the symptoms worsen, the patient should call the office. While transcribing every attempt was made to maintain the accuracy of the note in terms of it's contents,there may have been some errors made inadvertently.   Thank you for allowing me to participate in the care of this

## 2018-12-12 ENCOUNTER — OFFICE VISIT (OUTPATIENT)
Dept: PAIN MANAGEMENT | Age: 43
End: 2018-12-12
Payer: COMMERCIAL

## 2018-12-12 VITALS
SYSTOLIC BLOOD PRESSURE: 139 MMHG | DIASTOLIC BLOOD PRESSURE: 80 MMHG | WEIGHT: 225 LBS | BODY MASS INDEX: 29.69 KG/M2 | HEART RATE: 77 BPM

## 2018-12-12 DIAGNOSIS — M75.41 IMPINGEMENT SYNDROME OF RIGHT SHOULDER: ICD-10-CM

## 2018-12-12 DIAGNOSIS — S13.9XXA SPRAIN OF NECK, INITIAL ENCOUNTER: ICD-10-CM

## 2018-12-12 DIAGNOSIS — S13.9XXA NECK SPRAIN, INITIAL ENCOUNTER: ICD-10-CM

## 2018-12-12 DIAGNOSIS — M75.40 IMPINGEMENT SYNDROME OF SHOULDER, UNSPECIFIED LATERALITY: ICD-10-CM

## 2018-12-12 PROCEDURE — 99213 OFFICE O/P EST LOW 20 MIN: CPT | Performed by: INTERNAL MEDICINE

## 2018-12-12 RX ORDER — HYDROCODONE BITARTRATE AND ACETAMINOPHEN 5; 300 MG/1; MG/1
1 TABLET ORAL EVERY 6 HOURS PRN
Qty: 75 TABLET | Refills: 0 | Status: SHIPPED | OUTPATIENT
Start: 2018-12-12 | End: 2019-01-23 | Stop reason: SDUPTHER

## 2018-12-12 NOTE — PROGRESS NOTES
regimen  which is part of the above treatment schedule. It has been helping with Mr. Roger Tyler chronic  medical problems which for this visit include:   Diagnoses of [facility] Neck sprain, initial encounter and [facility] Impingement syndrome of right shoulder were pertinent to this visit. Risks and benefits of the medications and other alternative treatments  including no treatment were discussed with the patient. The common side effects of these medications were also explained to the patient. Informed verbal consent was obtained. Goals of current treatment regimen include improvement in pain, restoration of functioning- with focus on improvement in physical performance, general activity, work or disability,emotional distress, health care utilization and  decreased medication consumption. Will continue to monitor progress towards achieving/maintaining therapeutic goals with special emphasis on  1. Improvement in perceived interfernce  of pain with ADL's. Ability to do home exercises independently. Ability to do household chores indoor and/or outdoor work and social and leisure activities. Improve psychosocial and physical functioning. - he is showing progression towards this treatment goal with the current regimen. He was advised against drinking alcohol with the narcotic pain medicines, advised against driving or handling machinery while adjusting the dose of medicines or if having cognitive  issues related to the current medications. Risk of overdose and death, if medicines not taken as prescribed, were also discussed. If the patient develops new symptoms or if the symptoms worsen, the patient should call the office. While transcribing every attempt was made to maintain the accuracy of the note in terms of it's contents,there may have been some errors made inadvertently. Thank you for allowing me to participate in the care of this patient.     Alex Madera MD.    Cc: [facility]

## 2019-01-23 ENCOUNTER — OFFICE VISIT (OUTPATIENT)
Dept: PAIN MANAGEMENT | Age: 44
End: 2019-01-23
Payer: COMMERCIAL

## 2019-01-23 VITALS
WEIGHT: 235 LBS | BODY MASS INDEX: 31 KG/M2 | SYSTOLIC BLOOD PRESSURE: 121 MMHG | DIASTOLIC BLOOD PRESSURE: 79 MMHG | HEART RATE: 65 BPM

## 2019-01-23 DIAGNOSIS — S13.9XXA SPRAIN OF NECK, INITIAL ENCOUNTER: ICD-10-CM

## 2019-01-23 DIAGNOSIS — M75.40 IMPINGEMENT SYNDROME OF SHOULDER, UNSPECIFIED LATERALITY: ICD-10-CM

## 2019-01-23 PROCEDURE — 99213 OFFICE O/P EST LOW 20 MIN: CPT | Performed by: INTERNAL MEDICINE

## 2019-01-23 RX ORDER — HYDROCODONE BITARTRATE AND ACETAMINOPHEN 5; 300 MG/1; MG/1
1 TABLET ORAL EVERY 6 HOURS PRN
Qty: 75 TABLET | Refills: 0 | Status: SHIPPED | OUTPATIENT
Start: 2019-01-23 | End: 2019-03-06 | Stop reason: SDUPTHER

## 2019-03-06 ENCOUNTER — OFFICE VISIT (OUTPATIENT)
Dept: PAIN MANAGEMENT | Age: 44
End: 2019-03-06
Payer: COMMERCIAL

## 2019-03-06 VITALS
WEIGHT: 231 LBS | HEART RATE: 76 BPM | DIASTOLIC BLOOD PRESSURE: 89 MMHG | SYSTOLIC BLOOD PRESSURE: 129 MMHG | BODY MASS INDEX: 30.48 KG/M2

## 2019-03-06 DIAGNOSIS — S13.9XXA SPRAIN OF NECK, INITIAL ENCOUNTER: ICD-10-CM

## 2019-03-06 DIAGNOSIS — M75.41 IMPINGEMENT SYNDROME OF RIGHT SHOULDER: ICD-10-CM

## 2019-03-06 DIAGNOSIS — M75.40 IMPINGEMENT SYNDROME OF SHOULDER, UNSPECIFIED LATERALITY: ICD-10-CM

## 2019-03-06 DIAGNOSIS — S13.9XXA NECK SPRAIN, INITIAL ENCOUNTER: ICD-10-CM

## 2019-03-06 LAB
A/G RATIO: 1.7 (ref 1.1–2.2)
ALBUMIN SERPL-MCNC: 4.8 G/DL (ref 3.4–5)
ALP BLD-CCNC: 83 U/L (ref 40–129)
ALT SERPL-CCNC: 31 U/L (ref 10–40)
ANION GAP SERPL CALCULATED.3IONS-SCNC: 15 MMOL/L (ref 3–16)
AST SERPL-CCNC: 26 U/L (ref 15–37)
BILIRUB SERPL-MCNC: 0.4 MG/DL (ref 0–1)
BUN BLDV-MCNC: 13 MG/DL (ref 7–20)
CALCIUM SERPL-MCNC: 9.4 MG/DL (ref 8.3–10.6)
CHLORIDE BLD-SCNC: 102 MMOL/L (ref 99–110)
CO2: 25 MMOL/L (ref 21–32)
CREAT SERPL-MCNC: 0.9 MG/DL (ref 0.9–1.3)
GFR AFRICAN AMERICAN: >60
GFR NON-AFRICAN AMERICAN: >60
GLOBULIN: 2.8 G/DL
GLUCOSE BLD-MCNC: 108 MG/DL (ref 70–99)
HCT VFR BLD CALC: 44.8 % (ref 40.5–52.5)
HEMOGLOBIN: 15.5 G/DL (ref 13.5–17.5)
MCH RBC QN AUTO: 31.7 PG (ref 26–34)
MCHC RBC AUTO-ENTMCNC: 34.6 G/DL (ref 31–36)
MCV RBC AUTO: 91.5 FL (ref 80–100)
PDW BLD-RTO: 12.8 % (ref 12.4–15.4)
PLATELET # BLD: 358 K/UL (ref 135–450)
PMV BLD AUTO: 8.2 FL (ref 5–10.5)
POTASSIUM SERPL-SCNC: 4.5 MMOL/L (ref 3.5–5.1)
RBC # BLD: 4.89 M/UL (ref 4.2–5.9)
SODIUM BLD-SCNC: 142 MMOL/L (ref 136–145)
TOTAL PROTEIN: 7.6 G/DL (ref 6.4–8.2)
TSH SERPL DL<=0.05 MIU/L-ACNC: 1.54 UIU/ML (ref 0.27–4.2)
WBC # BLD: 7.8 K/UL (ref 4–11)

## 2019-03-06 PROCEDURE — 99213 OFFICE O/P EST LOW 20 MIN: CPT | Performed by: INTERNAL MEDICINE

## 2019-03-06 RX ORDER — HYDROCODONE BITARTRATE AND ACETAMINOPHEN 5; 300 MG/1; MG/1
1 TABLET ORAL EVERY 6 HOURS PRN
Qty: 75 TABLET | Refills: 0 | Status: SHIPPED | OUTPATIENT
Start: 2019-03-06 | End: 2019-04-17 | Stop reason: SDUPTHER

## 2019-04-17 ENCOUNTER — OFFICE VISIT (OUTPATIENT)
Dept: PAIN MANAGEMENT | Age: 44
End: 2019-04-17
Payer: COMMERCIAL

## 2019-04-17 VITALS
BODY MASS INDEX: 29.69 KG/M2 | HEART RATE: 86 BPM | WEIGHT: 225 LBS | SYSTOLIC BLOOD PRESSURE: 129 MMHG | DIASTOLIC BLOOD PRESSURE: 86 MMHG

## 2019-04-17 DIAGNOSIS — M75.40 IMPINGEMENT SYNDROME OF SHOULDER, UNSPECIFIED LATERALITY: ICD-10-CM

## 2019-04-17 DIAGNOSIS — S13.9XXA SPRAIN OF NECK, INITIAL ENCOUNTER: ICD-10-CM

## 2019-04-17 PROCEDURE — 99213 OFFICE O/P EST LOW 20 MIN: CPT | Performed by: INTERNAL MEDICINE

## 2019-04-17 RX ORDER — HYDROCODONE BITARTRATE AND ACETAMINOPHEN 5; 300 MG/1; MG/1
1 TABLET ORAL EVERY 6 HOURS PRN
Qty: 75 TABLET | Refills: 0 | Status: SHIPPED | OUTPATIENT
Start: 2019-04-17 | End: 2019-05-29 | Stop reason: SDUPTHER

## 2019-04-17 NOTE — PROGRESS NOTES
hours as needed for Pain for up to 42 days. max 1-2 per day 75 tablet 0    esomeprazole (NEXIUM) 40 MG capsule Take 40 mg by mouth every morning (before breakfast). No facility-administered medications prior to visit. SOCIAL/FAMILY/PAST MEDICAL HISTORY: Mr. Montemayor Dus, family and past medical history was reviewed. REVIEW OF SYSTEMS:    Respiratory: Negative for apnea, chest tightness and shortness of breath or change in baseline breathing. Gastrointestinal: Negative for nausea, vomiting, abdominal pain, diarrhea, constipation, blood in stool and abdominal distention. PHYSICAL EXAM:   Nursing note and vitals reviewed. /86   Pulse 86   Wt 225 lb (102.1 kg)   BMI 29.69 kg/m²   Constitutional: He appears well-developed and well-nourished. No acute distress. Skin: Skin is warm and dry, good turgor. No rash noted. He is not diaphoretic. Cardiovascular: Normal rate, regular rhythm, normal heart sounds, and does not have murmur. Pulmonary/Chest: Effort normal. No respiratory distress. He does not have wheezes in the lung fields. He has no rales. Neurological/Psychiatric:He is alert and oriented to person, place, and time. Coordination is  normal.  His mood isAppropriate and affect is Neutral/Euthymic(normal) . IMPRESSION:   1. [facility] Sprain of neck, initial encounter    2. [facility] Impingement syndrome of shoulder, unspecified laterality        PLAN:  Informed verbal consent was obtained  -[place] was obtained and reviewed  for the last one year and no indicators of drug misuse  were found. Any other controlled substance prescriptions  seen on the record have been accounted for, I am aware of the patient receiving these medications. Carson Gee [place] record will be rechecked as part of office protocol.   -continue with current opioid regimen   -Adv Biofeedback, relaxation and meditation techniques.  Referral to psychologist for CBT was also discussed with patient  -He was advised to increase fluids ( 5-7  glasses of fluid daily), limit caffeine, avoid cheese products, increase dietary fiber, increase activity and exercise as tolerated and relax regularly and enjoy meals  -advised neck/shoulder stretching exercises   -Patient's urine drug screen results with GC/MS confirmation were obtained and reviewed and were negative for any illicit drugs. Prescribed medications were within acceptable range. -Most recent labs were reviewed and are within normal limits. Will repeat them within next 9-12 months if there is no status change      Current Outpatient Medications   Medication Sig Dispense Refill    HYDROcodone-acetaminophen (XODOL) 5-300 MG TABS Take 1 tablet by mouth every 6 hours as needed for Pain for up to 42 days. max 1-2 per day 75 tablet 0    esomeprazole (NEXIUM) 40 MG capsule Take 40 mg by mouth every morning (before breakfast). No current facility-administered medications for this visit. I will continue his current medication regimen  which is part of the above treatment schedule. It has been helping with Mr. Orta Rota chronic  medical problems which for this visit include:   Diagnoses of [facility] Sprain of neck, initial encounter and [facility] Impingement syndrome of shoulder, unspecified laterality were pertinent to this visit. Risks and benefits of the medications and other alternative treatments  including no treatment were discussed with the patient. The common side effects of these medications were also explained to the patient. Informed verbal consent was obtained. Goals of current treatment regimen include improvement in pain, restoration of functioning- with focus on improvement in physical performance, general activity, work or disability,emotional distress, health care utilization and  decreased medication consumption. Will continue to monitor progress towards achieving/maintaining therapeutic goals with special emphasis on  1.  Improvement in perceived interfernce  of pain with ADL's. Ability to do home exercises independently. Ability to do household chores indoor and/or outdoor work and social and leisure activities. Improve psychosocial and physical functioning. - he is showing progression towards this treatment goal with the current regimen. He was advised against drinking alcohol with the narcotic pain medicines, advised against driving or handling machinery while adjusting the dose of medicines or if having cognitive  issues related to the current medications. Risk of overdose and death, if medicines not taken as prescribed, were also discussed. If the patient develops new symptoms or if the symptoms worsen, the patient should call the office. While transcribing every attempt was made to maintain the accuracy of the note in terms of it's contents,there may have been some errors made inadvertently. Thank you for allowing me to participate in the care of this patient. Cesar Rodriguez MD.    Cc: Kali Rausch, scribing for in the presence  of Dr. Cesar Rodriguez.   04/17/19  1:28 PM  Audery Canavan.  Ebenezer Galloway Assistant    I, Dr. Cesar Rodriguez, personally performed the services described in this documentation as scribed by  Lizbeth Brand MA in my presence and it is both accurate and complete

## 2019-05-29 ENCOUNTER — OFFICE VISIT (OUTPATIENT)
Dept: PAIN MANAGEMENT | Age: 44
End: 2019-05-29
Payer: COMMERCIAL

## 2019-05-29 VITALS
DIASTOLIC BLOOD PRESSURE: 76 MMHG | HEART RATE: 89 BPM | SYSTOLIC BLOOD PRESSURE: 109 MMHG | WEIGHT: 220 LBS | BODY MASS INDEX: 29.03 KG/M2

## 2019-05-29 DIAGNOSIS — S13.9XXA SPRAIN OF NECK, INITIAL ENCOUNTER: ICD-10-CM

## 2019-05-29 DIAGNOSIS — M75.40 IMPINGEMENT SYNDROME OF SHOULDER, UNSPECIFIED LATERALITY: ICD-10-CM

## 2019-05-29 PROCEDURE — 99213 OFFICE O/P EST LOW 20 MIN: CPT | Performed by: INTERNAL MEDICINE

## 2019-05-29 RX ORDER — HYDROCODONE BITARTRATE AND ACETAMINOPHEN 5; 300 MG/1; MG/1
1 TABLET ORAL EVERY 6 HOURS PRN
Qty: 90 TABLET | Refills: 0 | Status: SHIPPED | OUTPATIENT
Start: 2019-05-29 | End: 2019-07-17 | Stop reason: SDUPTHER

## 2019-05-29 NOTE — PROGRESS NOTES
Micheal Gomes   1975  [id number]    HISTORY OF PRESENT ILLNESS:  Mr. Boaz Cordero is a 41 y.o. male returns for a follow up visit for multiple medical problems. His current presenting problems are   1. [facility] Sprain of neck, initial encounter    2. [facility] Impingement syndrome of shoulder, unspecified laterality    . As per information/history obtained from the PADT(patient assessment and documentation tool) - He complains of pain in the neck, upper back, mid back and lower back with radiation to the buttocks and hips Bilateral He rates the pain 7/10 and describes it as sharp, aching, burning, numbness. Pain is made worse by: movement, walking, standing, sitting, bending, lifting. Current treatment regimen has helped relieve about 70% of the pain. He denies side effects from the current pain regimen. Patient reports that since the last follow up visit the physical functioning is unchanged, family/social relationships are unchanged, mood is unchanged and sleep patterns are unchanged, and that the overall functioning is unchanged. Patient denies neurological bowel or bladder. Patient denies misusing/abusing his narcotic pain medications or using any illegal drugs. There are No indicators for possible drug abuse, addiction or diversion problems. Upon obtaining the medical history from Mr. Boaz Cordero regarding today's office visit for his presenting problems,  Patient states he has been working along of hours, about 60 + hours per week. He complains he is having increase pain in the neck and shoulders. He says he is using Xodol as needed 1-2 per day long with OTC ibuprofen. He mentions he is managing his ADLs and house chores. ALLERGIES: Patients list of allergies were reviewed     MEDICATIONS: Mr. Boaz Cordero list of medications were reviewed. His current medications are   Outpatient Medications Prior to Visit   Medication Sig Dispense Refill    HYDROcodone-acetaminophen (XODOL) 5-300 MG TABS Take 1 tablet by mouth every 6 hours as needed for Pain for up to 42 days. max 1-2 per day 75 tablet 0    esomeprazole (NEXIUM) 40 MG capsule Take 40 mg by mouth every morning (before breakfast). No facility-administered medications prior to visit. SOCIAL/FAMILY/PAST MEDICAL HISTORY: Mr. Maritza Cervantes, family and past medical history was reviewed. REVIEW OF SYSTEMS:    Respiratory: Negative for apnea, chest tightness and shortness of breath or change in baseline breathing. Gastrointestinal: Negative for nausea, vomiting, abdominal pain, diarrhea, constipation, blood in stool and abdominal distention. PHYSICAL EXAM:   Nursing note and vitals reviewed. /76   Pulse 89   Wt 220 lb (99.8 kg)   BMI 29.03 kg/m²   Constitutional: He appears well-developed and well-nourished. No acute distress. Skin: Skin is warm and dry, good turgor. No rash noted. He is not diaphoretic. Cardiovascular: Normal rate, regular rhythm, normal heart sounds, and does not have murmur. Pulmonary/Chest: Effort normal. No respiratory distress. He does not have wheezes in the lung fields. He has no rales. Neurological/Psychiatric:He is alert and oriented to person, place, and time. Coordination is  normal.  His mood isAppropriate and affect is Neutral/Euthymic(normal) .      IMPRESSION:   1. [facility] Sprain of neck, initial encounter    2. [facility] Impingement syndrome of shoulder, unspecified laterality        PLAN:  Informed verbal consent was obtained  -Continue with current opioid regimen   -Shoulder exercises as advised   -He was advised to increase fluids ( 5-7  glasses of fluid daily), limit caffeine, avoid cheese products, increase dietary fiber, increase activity and exercise as tolerated and relax regularly and enjoy meals   -Walking as tolerated 20 minutes daily    Current Outpatient Medications   Medication Sig Dispense Refill    HYDROcodone-acetaminophen (XODOL) 5-300 MG TABS Take 1 tablet by mouth every 6 hours as needed for Pain for up to 42 days. max 1-2 per day 75 tablet 0    esomeprazole (NEXIUM) 40 MG capsule Take 40 mg by mouth every morning (before breakfast). No current facility-administered medications for this visit. I will continue his current medication regimen  which is part of the above treatment schedule. It has been helping with Mr. Marnell Lennox chronic  medical problems which for this visit include:   Diagnoses of [facility] Sprain of neck, initial encounter and [facility] Impingement syndrome of shoulder, unspecified laterality were pertinent to this visit. Risks and benefits of the medications and other alternative treatments  including no treatment were discussed with the patient. The common side effects of these medications were also explained to the patient. Informed verbal consent was obtained. Goals of current treatment regimen include improvement in pain, restoration of functioning- with focus on improvement in physical performance, general activity, work or disability,emotional distress, health care utilization and  decreased medication consumption. Will continue to monitor progress towards achieving/maintaining therapeutic goals with special emphasis on  1. Improvement in perceived interfernce  of pain with ADL's. Ability to do home exercises independently. Ability to do household chores indoor and/or outdoor work and social and leisure activities. Improve psychosocial and physical functioning. - he is showing progression towards this treatment goal with the current regimen. He was advised against drinking alcohol with the narcotic pain medicines, advised against driving or handling machinery while adjusting the dose of medicines or if having cognitive  issues related to the current medications. Risk of overdose and death, if medicines not taken as prescribed, were also discussed. If the patient develops new symptoms or if the symptoms worsen, the patient should call the office.     While transcribing every attempt was made to maintain the accuracy of the note in terms of it's contents,there may have been some errors made inadvertently. Thank you for allowing me to participate in the care of this patient.     Yuko Carlin MD.    Cc: John Alfaro DO    I, Angi Santos, am scribing for and in the presence of Dr. Yuko Carlin.   05/29/19  1:33 PM  Angi Santos MA     I, Dr. Yuko Carlin, personally performed the services described in this documentation as scribed by   Angi Santos MA in my presence and it is both accurate and complete

## 2019-07-17 ENCOUNTER — OFFICE VISIT (OUTPATIENT)
Dept: PAIN MANAGEMENT | Age: 44
End: 2019-07-17
Payer: COMMERCIAL

## 2019-07-17 VITALS
HEART RATE: 77 BPM | SYSTOLIC BLOOD PRESSURE: 106 MMHG | WEIGHT: 220 LBS | DIASTOLIC BLOOD PRESSURE: 69 MMHG | BODY MASS INDEX: 29.03 KG/M2

## 2019-07-17 DIAGNOSIS — S13.9XXA SPRAIN OF NECK, INITIAL ENCOUNTER: ICD-10-CM

## 2019-07-17 DIAGNOSIS — M75.40 IMPINGEMENT SYNDROME OF SHOULDER, UNSPECIFIED LATERALITY: ICD-10-CM

## 2019-07-17 PROCEDURE — 99213 OFFICE O/P EST LOW 20 MIN: CPT | Performed by: INTERNAL MEDICINE

## 2019-07-17 RX ORDER — HYDROCODONE BITARTRATE AND ACETAMINOPHEN 5; 300 MG/1; MG/1
1 TABLET ORAL EVERY 6 HOURS PRN
Qty: 75 TABLET | Refills: 0 | Status: SHIPPED | OUTPATIENT
Start: 2019-07-17 | End: 2019-08-28 | Stop reason: SDUPTHER

## 2019-07-17 NOTE — PROGRESS NOTES
was advised to increase fluids ( 5-7  glasses of fluid daily), limit caffeine, avoid cheese products, increase dietary fiber, increase activity and exercise as tolerated and relax regularly and enjoy meals   -Walking as tolerated 20 minutes daily      Current Outpatient Medications   Medication Sig Dispense Refill    HYDROcodone-acetaminophen (XODOL) 5-300 MG TABS Take 1 tablet by mouth every 6 hours as needed for Pain for up to 49 days. max 1-2 per day 90 tablet 0    esomeprazole (NEXIUM) 40 MG capsule Take 40 mg by mouth every morning (before breakfast). No current facility-administered medications for this visit. I will continue his current medication regimen  which is part of the above treatment schedule. It has been helping with Mr. Governor Farrell chronic  medical problems which for this visit include:   Diagnoses of [facility] Sprain of neck, initial encounter and [facility] Impingement syndrome of shoulder, unspecified laterality were pertinent to this visit. Risks and benefits of the medications and other alternative treatments  including no treatment were discussed with the patient. The common side effects of these medications were also explained to the patient. Informed verbal consent was obtained. Goals of current treatment regimen include improvement in pain, restoration of functioning- with focus on improvement in physical performance, general activity, work or disability,emotional distress, health care utilization and  decreased medication consumption. Will continue to monitor progress towards achieving/maintaining therapeutic goals with special emphasis on  1. Improvement in perceived interfernce  of pain with ADL's. Ability to do home exercises independently. Ability to do household chores indoor and/or outdoor work and social and leisure activities. Improve psychosocial and physical functioning. - he is showing progression towards this treatment goal with the current regimen.      He was advised against drinking alcohol with the narcotic pain medicines, advised against driving or handling machinery while adjusting the dose of medicines or if having cognitive  issues related to the current medications. Risk of overdose and death, if medicines not taken as prescribed, were also discussed. If the patient develops new symptoms or if the symptoms worsen, the patient should call the office. While transcribing every attempt was made to maintain the accuracy of the note in terms of it's contents,there may have been some errors made inadvertently. Thank you for allowing me to participate in the care of this patient. Alyce Fong MD.    Cc: Chente Byers, scribing for in the presence  of Dr. Alyce Fong.   07/17/19  1:39 PM  Tanisha Ahmadi Assistant    I, Dr. Alyce Fong, personally performed the services described in this documentation as scribed by  Marylee Matin ,MA in my presence and it is both accurate and complete

## 2019-08-28 ENCOUNTER — OFFICE VISIT (OUTPATIENT)
Dept: PAIN MANAGEMENT | Age: 44
End: 2019-08-28
Payer: COMMERCIAL

## 2019-08-28 VITALS
WEIGHT: 225 LBS | HEART RATE: 86 BPM | BODY MASS INDEX: 29.69 KG/M2 | DIASTOLIC BLOOD PRESSURE: 79 MMHG | SYSTOLIC BLOOD PRESSURE: 117 MMHG

## 2019-08-28 DIAGNOSIS — S13.9XXA SPRAIN OF NECK, INITIAL ENCOUNTER: ICD-10-CM

## 2019-08-28 DIAGNOSIS — M75.40 IMPINGEMENT SYNDROME OF SHOULDER, UNSPECIFIED LATERALITY: ICD-10-CM

## 2019-08-28 PROCEDURE — 99213 OFFICE O/P EST LOW 20 MIN: CPT | Performed by: INTERNAL MEDICINE

## 2019-08-28 RX ORDER — HYDROCODONE BITARTRATE AND ACETAMINOPHEN 5; 300 MG/1; MG/1
1 TABLET ORAL EVERY 6 HOURS PRN
Qty: 75 TABLET | Refills: 0 | Status: SHIPPED | OUTPATIENT
Start: 2019-08-28 | End: 2019-10-09 | Stop reason: SDUPTHER

## 2019-08-28 NOTE — PROGRESS NOTES
Maria Victoria Azul   1975  [id number]    HISTORY OF PRESENT ILLNESS:  Mr. Edis Duncan is a 45 y.o. male returns for a follow up visit for multiple medical problems. His current presenting problems are   1. [facility] Sprain of neck, initial encounter    2. [facility] Impingement syndrome of shoulder, unspecified laterality    . As per information/history obtained from the PADT(patient assessment and documentation tool) - He complains of pain in the shoulders Right, elbows Right, upper back, mid back and lower back with radiation to the arms Right, buttocks and hips Bilateral He rates the pain 7/10 and describes it as aching, burning, numbness. Pain is made worse by: movement, walking, standing, sitting, bending, lifting. Current treatment regimen has helped relieve about 70% of the pain. He denies side effects from the current pain regimen. Patient reports that since the last follow up visit the physical functioning is better, family/social relationships are unchanged, mood is unchanged and sleep patterns are unchanged, and that the overall functioning is better. Patient denies neurological bowel or bladder. Patient denies misusing/abusing his narcotic pain medications or using any illegal drugs. There are No indicators for possible drug abuse, addiction or diversion problems. Upon obtaining the medical history from Mr. Edis Duncan regarding today's office visit for his presenting problems, Mr. Edis Duncan states he is having some dental problems, he needs extraction. Patient has been complaint wit his medications. He mentions he is using Xodol 1-2 per day. He reports he is working full time still. He says his shoulder/neck hurts worse than his arms. Patient denies any constipation symptoms. He mentions using his shoulder overhead causes increase pain. ALLERGIES: Patients list of allergies were reviewed     MEDICATIONS: Mr. Edis Duncan list of medications were reviewed. His current medications are   Outpatient Medications Prior to Visit   Medication Sig Dispense Refill    esomeprazole (NEXIUM) 40 MG capsule Take 40 mg by mouth every morning (before breakfast). No facility-administered medications prior to visit. SOCIAL/FAMILY/PAST MEDICAL HISTORY: Mr. Ray Joiner, family and past medical history was reviewed. REVIEW OF SYSTEMS:    Respiratory: Negative for apnea, chest tightness and shortness of breath or change in baseline breathing. Gastrointestinal: Negative for nausea, vomiting, abdominal pain, diarrhea, constipation, blood in stool and abdominal distention. PHYSICAL EXAM:   Nursing note and vitals reviewed. /79   Pulse 86   Wt 225 lb (102.1 kg)   BMI 29.69 kg/m²   Constitutional: He appears well-developed and well-nourished. No acute distress. Skin: Skin is warm and dry, good turgor. No rash noted. He is not diaphoretic. Cardiovascular: Normal rate, regular rhythm, normal heart sounds, and does not have murmur. Pulmonary/Chest: Effort normal. No respiratory distress. He does not have wheezes in the lung fields. He has no rales. Neurological/Psychiatric:He is alert and oriented to person, place, and time. Coordination is  normal.  His mood isAppropriate and affect is Neutral/Euthymic(normal) . IMPRESSION:   1. [facility] Sprain of neck, initial encounter    2. [facility] Impingement syndrome of shoulder, unspecified laterality        PLAN:  Informed verbal consent was obtained  -continue with current opioid regimen   -Adv Biofeedback, relaxation and meditation techniques.  Referral to psychologist for CBT was also discussed with patient  -continue with NSAID's along with Eli Mode  -He was advised to increase fluids ( 5-7  glasses of fluid daily), limit caffeine, avoid cheese products, increase dietary fiber, increase activity and exercise as tolerated and relax regularly and enjoy meals   -neck/shoulder stretching exercises as advised    Current Outpatient Medications   Medication Sig Dispense Refill

## 2019-08-28 NOTE — LETTER
[facility]  [id number] [place] [address]  Phone: [phone]  Fax: [phone]    Medora Closs, MD        August 28, 2019     Patient: Jeremy Salomon   YOB: 1975   Date of Visit: 8/28/2019       To Whom it May Concern:    Manasa Paige was seen in my clinic on 8/28/2019. He may return to work on 8/29/2019. If you have any questions or concerns, please don't hesitate to call.     Sincerely,         Medora Closs, MD

## 2019-10-09 ENCOUNTER — OFFICE VISIT (OUTPATIENT)
Dept: PAIN MANAGEMENT | Age: 44
End: 2019-10-09
Payer: COMMERCIAL

## 2019-10-09 VITALS
BODY MASS INDEX: 29.42 KG/M2 | DIASTOLIC BLOOD PRESSURE: 89 MMHG | WEIGHT: 223 LBS | SYSTOLIC BLOOD PRESSURE: 133 MMHG | HEART RATE: 94 BPM

## 2019-10-09 DIAGNOSIS — M75.40 IMPINGEMENT SYNDROME OF SHOULDER, UNSPECIFIED LATERALITY: ICD-10-CM

## 2019-10-09 DIAGNOSIS — S13.9XXA SPRAIN OF NECK, INITIAL ENCOUNTER: ICD-10-CM

## 2019-10-09 PROCEDURE — 99213 OFFICE O/P EST LOW 20 MIN: CPT | Performed by: INTERNAL MEDICINE

## 2019-10-09 RX ORDER — HYDROCODONE BITARTRATE AND ACETAMINOPHEN 5; 300 MG/1; MG/1
1 TABLET ORAL EVERY 6 HOURS PRN
Qty: 75 TABLET | Refills: 0 | Status: SHIPPED | OUTPATIENT
Start: 2019-10-09 | End: 2019-11-20 | Stop reason: SDUPTHER

## 2019-11-20 ENCOUNTER — OFFICE VISIT (OUTPATIENT)
Dept: PAIN MANAGEMENT | Age: 44
End: 2019-11-20
Payer: COMMERCIAL

## 2019-11-20 VITALS
DIASTOLIC BLOOD PRESSURE: 79 MMHG | WEIGHT: 220 LBS | HEART RATE: 93 BPM | BODY MASS INDEX: 29.03 KG/M2 | SYSTOLIC BLOOD PRESSURE: 132 MMHG

## 2019-11-20 DIAGNOSIS — S13.9XXA SPRAIN OF NECK, INITIAL ENCOUNTER: ICD-10-CM

## 2019-11-20 DIAGNOSIS — M75.40 IMPINGEMENT SYNDROME OF SHOULDER, UNSPECIFIED LATERALITY: ICD-10-CM

## 2019-11-20 PROCEDURE — 99213 OFFICE O/P EST LOW 20 MIN: CPT | Performed by: INTERNAL MEDICINE

## 2019-11-20 RX ORDER — HYDROCODONE BITARTRATE AND ACETAMINOPHEN 5; 300 MG/1; MG/1
1 TABLET ORAL EVERY 6 HOURS PRN
Qty: 90 TABLET | Refills: 0 | Status: SHIPPED | OUTPATIENT
Start: 2019-11-20 | End: 2019-11-20 | Stop reason: SDUPTHER

## 2019-11-20 RX ORDER — HYDROCODONE BITARTRATE AND ACETAMINOPHEN 5; 300 MG/1; MG/1
1 TABLET ORAL EVERY 6 HOURS PRN
Qty: 90 TABLET | Refills: 0 | Status: SHIPPED | OUTPATIENT
Start: 2019-11-20 | End: 2020-01-08 | Stop reason: SDUPTHER

## 2020-01-08 ENCOUNTER — OFFICE VISIT (OUTPATIENT)
Dept: PAIN MANAGEMENT | Age: 45
End: 2020-01-08
Payer: COMMERCIAL

## 2020-01-08 VITALS
BODY MASS INDEX: 28.89 KG/M2 | DIASTOLIC BLOOD PRESSURE: 72 MMHG | SYSTOLIC BLOOD PRESSURE: 120 MMHG | HEART RATE: 73 BPM | HEIGHT: 73 IN | WEIGHT: 218 LBS

## 2020-01-08 PROCEDURE — 99213 OFFICE O/P EST LOW 20 MIN: CPT | Performed by: INTERNAL MEDICINE

## 2020-01-08 RX ORDER — HYDROCODONE BITARTRATE AND ACETAMINOPHEN 5; 300 MG/1; MG/1
1 TABLET ORAL EVERY 6 HOURS PRN
Qty: 75 TABLET | Refills: 0 | Status: SHIPPED | OUTPATIENT
Start: 2020-01-08 | End: 2020-02-19 | Stop reason: SDUPTHER

## 2020-01-08 NOTE — PROGRESS NOTES
esomeprazole (NEXIUM) 40 MG capsule Take 40 mg by mouth every morning (before breakfast). No facility-administered medications prior to visit. SOCIAL/FAMILY/PAST MEDICAL HISTORY: Mr. Darell Schultz, family and past medical history was reviewed. REVIEW OF SYSTEMS:    Respiratory: Negative for apnea, chest tightness and shortness of breath or change in baseline breathing. Gastrointestinal: Negative for nausea, vomiting, abdominal pain, diarrhea, constipation, blood in stool and abdominal distention. PHYSICAL EXAM:   Nursing note and vitals reviewed. /72   Pulse 73   Ht 6' 1\" (1.854 m)   Wt 218 lb (98.9 kg)   BMI 28.76 kg/m²   Constitutional: He appears well-developed and well-nourished. No acute distress. Skin: Skin is warm and dry, good turgor. No rash noted. He is not diaphoretic. Cardiovascular: Normal rate, regular rhythm, normal heart sounds, and does not have murmur. Pulmonary/Chest: Effort normal. No respiratory distress. He does not have wheezes in the lung fields. He has no rales. Neurological/Psychiatric:He is alert and oriented to person, place, and time. Coordination is  normal.  His mood isAppropriate and affect is Neutral/Euthymic(normal) . IMPRESSION:   1. [facility] Sprain of neck, initial encounter    2. [facility] Impingement syndrome of shoulder, unspecified laterality        PLAN:  Informed verbal consent was obtained  -[place] was obtained and reviewed  for the last one year and no indicators of drug misuse  were found. Any other controlled substance prescriptions  seen on the record have been accounted for, I am aware of the patient receiving these medications. Evelyn Banks  [place] record will be rechecked as part of office protocol.    -He was advised to increase fluids ( 5-7  glasses of fluid daily), limit caffeine, avoid cheese products, increase dietary fiber, increase activity and exercise as tolerated and relax regularly and enjoy meals   -Walking/Stretching exercises for shoulder   -Adv Biofeedback, relaxation and meditation techniques. Referral to psychologist for CBT was also discussed with patient   -Continue with Xodol 2-3 per day      Current Outpatient Medications   Medication Sig Dispense Refill    HYDROcodone-acetaminophen (XODOL) 5-300 MG TABS Take 1 tablet by mouth every 6 hours as needed for Pain for up to 49 days. 90 tablet 0    esomeprazole (NEXIUM) 40 MG capsule Take 40 mg by mouth every morning (before breakfast). No current facility-administered medications for this visit. I will continue his current medication regimen  which is part of the above treatment schedule. It has been helping with Mr. Kaden Cunningham chronic  medical problems which for this visit include:   Diagnoses of [facility] Sprain of neck, initial encounter and [facility] Impingement syndrome of shoulder, unspecified laterality were pertinent to this visit. Risks and benefits of the medications and other alternative treatments  including no treatment were discussed with the patient. The common side effects of these medications were also explained to the patient. Informed verbal consent was obtained. Goals of current treatment regimen include improvement in pain, restoration of functioning- with focus on improvement in physical performance, general activity, work or disability,emotional distress, health care utilization and  decreased medication consumption. Will continue to monitor progress towards achieving/maintaining therapeutic goals with special emphasis on  1. Improvement in perceived interfernce  of pain with ADL's. Ability to do home exercises independently. Ability to do household chores indoor and/or outdoor work and social and leisure activities. Improve psychosocial and physical functioning. - he is showing progression towards this treatment goal with the current regimen.        He was advised against drinking alcohol with the narcotic pain medicines, advised against driving or handling machinery while adjusting the dose of medicines or if having cognitive  issues related to the current medications. Risk of overdose and death, if medicines not taken as prescribed, were also discussed. If the patient develops new symptoms or if the symptoms worsen, the patient should call the office. While transcribing every attempt was made to maintain the accuracy of the note in terms of it's contents,there may have been some errors made inadvertently. Thank you for allowing me to participate in the care of this patient. Jessa Estrada MD.    Cc: Ab Alejandre DO    I, Lavelle Hester, am scribing for and in the presence of Dr. Jessa Estrada.    01/08/20  1:47 PM  ELIZABETH Natarajan, Dr. Jessa Estrada, personally performed the services described in this documentation as scribed by   Lavelle Hseter MA in my presence and it is both accurate and complete

## 2020-02-19 ENCOUNTER — OFFICE VISIT (OUTPATIENT)
Dept: PAIN MANAGEMENT | Age: 45
End: 2020-02-19
Payer: COMMERCIAL

## 2020-02-19 VITALS
HEART RATE: 69 BPM | SYSTOLIC BLOOD PRESSURE: 130 MMHG | WEIGHT: 211 LBS | DIASTOLIC BLOOD PRESSURE: 90 MMHG | BODY MASS INDEX: 27.84 KG/M2

## 2020-02-19 DIAGNOSIS — M75.40 IMPINGEMENT SYNDROME OF SHOULDER, UNSPECIFIED LATERALITY: ICD-10-CM

## 2020-02-19 DIAGNOSIS — S13.9XXA SPRAIN OF NECK, INITIAL ENCOUNTER: ICD-10-CM

## 2020-02-19 LAB
A/G RATIO: 2 (ref 1.1–2.2)
ALBUMIN SERPL-MCNC: 5 G/DL (ref 3.4–5)
ALP BLD-CCNC: 91 U/L (ref 40–129)
ALT SERPL-CCNC: 26 U/L (ref 10–40)
ANION GAP SERPL CALCULATED.3IONS-SCNC: 12 MMOL/L (ref 3–16)
AST SERPL-CCNC: 23 U/L (ref 15–37)
BILIRUB SERPL-MCNC: 0.3 MG/DL (ref 0–1)
BUN BLDV-MCNC: 18 MG/DL (ref 7–20)
CALCIUM SERPL-MCNC: 9.7 MG/DL (ref 8.3–10.6)
CHLORIDE BLD-SCNC: 100 MMOL/L (ref 99–110)
CO2: 27 MMOL/L (ref 21–32)
CREAT SERPL-MCNC: 1.1 MG/DL (ref 0.9–1.3)
GFR AFRICAN AMERICAN: >60
GFR NON-AFRICAN AMERICAN: >60
GLOBULIN: 2.5 G/DL
GLUCOSE BLD-MCNC: 111 MG/DL (ref 70–99)
HCT VFR BLD CALC: 45.3 % (ref 40.5–52.5)
HEMOGLOBIN: 15.6 G/DL (ref 13.5–17.5)
MCH RBC QN AUTO: 31.3 PG (ref 26–34)
MCHC RBC AUTO-ENTMCNC: 34.4 G/DL (ref 31–36)
MCV RBC AUTO: 90.9 FL (ref 80–100)
PDW BLD-RTO: 12.7 % (ref 12.4–15.4)
PLATELET # BLD: 326 K/UL (ref 135–450)
PMV BLD AUTO: 8.5 FL (ref 5–10.5)
POTASSIUM SERPL-SCNC: 4.6 MMOL/L (ref 3.5–5.1)
RBC # BLD: 4.98 M/UL (ref 4.2–5.9)
SODIUM BLD-SCNC: 139 MMOL/L (ref 136–145)
TOTAL PROTEIN: 7.5 G/DL (ref 6.4–8.2)
WBC # BLD: 7.5 K/UL (ref 4–11)

## 2020-02-19 PROCEDURE — 99213 OFFICE O/P EST LOW 20 MIN: CPT | Performed by: INTERNAL MEDICINE

## 2020-02-19 RX ORDER — HYDROCODONE BITARTRATE AND ACETAMINOPHEN 5; 300 MG/1; MG/1
1 TABLET ORAL EVERY 6 HOURS PRN
Qty: 70 TABLET | Refills: 0 | Status: SHIPPED | OUTPATIENT
Start: 2020-02-19 | End: 2020-04-01 | Stop reason: SDUPTHER

## 2020-02-19 NOTE — PROGRESS NOTES
were reviewed. His current medications are   Outpatient Medications Prior to Visit   Medication Sig Dispense Refill    HYDROcodone-acetaminophen (XODOL) 5-300 MG TABS Take 1 tablet by mouth every 6 hours as needed for Pain (max 1-2/day) for up to 42 days. 75 tablet 0    esomeprazole (NEXIUM) 40 MG capsule Take 40 mg by mouth every morning (before breakfast). No facility-administered medications prior to visit. SOCIAL/FAMILY/PAST MEDICAL HISTORY: Mr. Liliana Ledezma, family and past medical history was reviewed. REVIEW OF SYSTEMS:    Respiratory: Negative for apnea, chest tightness and shortness of breath or change in baseline breathing. Gastrointestinal: Negative for nausea, vomiting, abdominal pain, diarrhea, constipation, blood in stool and abdominal distention. PHYSICAL EXAM:   Nursing note and vitals reviewed. BP (!) 130/90   Pulse 69   Wt 211 lb (95.7 kg)   BMI 27.84 kg/m²   Constitutional: He appears well-developed and well-nourished. No acute distress. Skin: Skin is warm and dry, good turgor. No rash noted. He is not diaphoretic. Cardiovascular: Normal rate, regular rhythm, normal heart sounds, and does not have murmur. Pulmonary/Chest: Effort normal. No respiratory distress. He does not have wheezes in the lung fields. He has no rales. Neurological/Psychiatric:He is alert and oriented to person, place, and time. Coordination is  normal.  His mood isAppropriate and affect is Neutral/Euthymic(normal) . IMPRESSION:   1. [facility] Sprain of neck, initial encounter    2. [facility] Impingement syndrome of shoulder, unspecified laterality        PLAN:  Informed verbal consent was obtained  -Continue with current opioid regimen Xodol 1-2 per day   -Urine drug screen with GC/MS for opiates and drugs of abuse was ordered and will follow up on results.   -Labs ordered CBC, CMP, and TSH.    -Walking/Stretching exercises as advised    -Shoulder and upper back exercises as advised   Current

## 2020-04-01 ENCOUNTER — VIRTUAL VISIT (OUTPATIENT)
Dept: PAIN MANAGEMENT | Age: 45
End: 2020-04-01
Payer: COMMERCIAL

## 2020-04-01 PROCEDURE — 99213 OFFICE O/P EST LOW 20 MIN: CPT | Performed by: INTERNAL MEDICINE

## 2020-04-01 RX ORDER — HYDROCODONE BITARTRATE AND ACETAMINOPHEN 5; 300 MG/1; MG/1
1 TABLET ORAL EVERY 6 HOURS PRN
Qty: 70 TABLET | Refills: 0 | Status: SHIPPED | OUTPATIENT
Start: 2020-04-01 | End: 2020-05-13 | Stop reason: SDUPTHER

## 2020-04-01 NOTE — PROGRESS NOTES
There are No indicators for possible drug abuse, addiction or diversion problems. Mr. Sandro Lynch states his pain has been manageable with the medications. He says his shoulder has been hurting, he is having soreness with increase physical activity. Patient denies any side effects with the medications. He reports he is staying active around the house. ALLERGIES: Patients list of allergies were reviewed     MEDICATIONS: Mr. Sandro Lynch list of medications were reviewed. His current medications are   Outpatient Medications Prior to Visit   Medication Sig Dispense Refill    HYDROcodone-acetaminophen (XODOL) 5-300 MG TABS Take 1 tablet by mouth every 6 hours as needed for Pain (max 1-2/day) for up to 42 days. 70 tablet 0    esomeprazole (NEXIUM) 40 MG capsule Take 40 mg by mouth every morning (before breakfast). No facility-administered medications prior to visit. SOCIAL/FAMILY/PAST MEDICAL HISTORY: Mr. Saul Riggs, family and past medical history was reviewed. REVIEW OF SYSTEMS:    Respiratory: Negative for apnea, chest tightness and shortness of breath or change in baseline breathing. Gastrointestinal: Negative for nausea, vomiting, abdominal pain, diarrhea, constipation, blood in stool and abdominal distention. PHYSICAL EXAM:   Nursing note and vitals per patient reviewed. There were no vitals taken for this visit. Constitutional: He appears well-developed and well-nourished. No acute distress. No respiratory distress. Skin: Skin appears to be warm and dry. No rashes or any other marks noted. He is not diaphoretic. Neurological/Psychiatric:He is alert and oriented to person, place, and time. Coordination is  normal.  His mood isAppropriate and affect is Neutral/Euthymic(normal). His behavior is normal.   thought content normal.   Musculoskeletal / Extremities: Range of motion is normal. Gait is normal, assistive devices use: none.      IMPRESSION:   1. [facility] Sprain of neck, initial decreased medication consumption. Will continue to monitor progress towards achieving/maintaining therapeutic goals with special emphasis on  1. Improvement in perceived interfernce  of pain with ADL's. Ability to do home exercises independently. Ability to do household chores indoor and/or outdoor work and social and leisure activities. Improve psychosocial and physical functioning. - he is showing progression towards this treatment goal with the current regimen. 2. Ability to focus/concentrate at work and perform the duties required of him at work  Sit through a workday without lower extremity symptoms. Stand 30-60 minutes without lower extremity symptoms. Ability to lift up to 10-20 lbs. Ability to go up and down stairs. Sit 30-60 minutes  Without having to stand up frequently. - he is maintaining/progressing towards his work related goals with the current regimen. He was advised against drinking alcohol with the narcotic pain medicines, advised against driving or handling machinery while adjusting the dose of medicines or if having cognitive  issues related to the current medications. Risk of overdose and death, if medicines not taken as prescribed, were also discussed. If the patient develops new symptoms or if the symptoms worsen, the patient should call the office. While transcribing every attempt was made to maintain the accuracy of the note in terms of it's contents,there may have been some errors made inadvertently. Thank you for allowing me to participate in the care of this patient.     Linda Damian MD.    Cc: [id number] [facility]

## 2020-05-13 ENCOUNTER — VIRTUAL VISIT (OUTPATIENT)
Dept: PAIN MANAGEMENT | Age: 45
End: 2020-05-13
Payer: COMMERCIAL

## 2020-05-13 PROCEDURE — 99213 OFFICE O/P EST LOW 20 MIN: CPT | Performed by: INTERNAL MEDICINE

## 2020-05-13 RX ORDER — HYDROCODONE BITARTRATE AND ACETAMINOPHEN 5; 300 MG/1; MG/1
1 TABLET ORAL EVERY 6 HOURS PRN
Qty: 70 TABLET | Refills: 0 | Status: SHIPPED | OUTPATIENT
Start: 2020-05-13 | End: 2020-06-24 | Stop reason: SDUPTHER

## 2020-05-13 NOTE — PROGRESS NOTES
is alert and oriented to person, place, and time. Coordination is  normal.  His mood isAppropriate and affect is Anxious. Musculoskeletal / Extremities: Range of motion is normal. Gait is normal, assistive devices use: none. IMPRESSION:   1. [facility] Sprain of neck, initial encounter    2. [facility] Impingement syndrome of shoulder, laterality    3. [facility] Impingement syndrome of shoulder, unspecified laterality        PLAN:  Informed verbal consent regarding treatment was obtained  -continue with current opioid regimen, Xodol 1-2 per day  -Adv Biofeedback, relaxation and meditation techniques. Referral to psychologist for CBT was also discussed with patient  -He was advised to increase fluids ( 5-7  glasses of fluid daily), limit caffeine, avoid cheese products, increase dietary fiber, increase activity and exercise as tolerated and relax regularly and enjoy meals   -walking as tolerated   -neck postural exercises as advised     Current Outpatient Medications   Medication Sig Dispense Refill    HYDROcodone-acetaminophen (XODOL) 5-300 MG TABS Take 1 tablet by mouth every 6 hours as needed for Pain (max 1-2/day) for up to 42 days. 70 tablet 0    esomeprazole (NEXIUM) 40 MG capsule Take 40 mg by mouth every morning (before breakfast). No current facility-administered medications for this visit. I will continue his current medication regimen  which is part of the above treatment schedule. It has been helping with Mr. Vasquez Vandana chronic  medical problems which for this visit include:   Diagnoses of [facility] Sprain of neck, initial encounter and [facility] Impingement syndrome of shoulder, laterality were pertinent to this visit. Risks and benefits of the medications and other alternative treatments  including no treatment were discussed with the patient. The common side effects of these medications were also explained to the patient. Informed verbal consent was obtained.    Goals of current treatment regimen include

## 2020-06-24 ENCOUNTER — VIRTUAL VISIT (OUTPATIENT)
Dept: PAIN MANAGEMENT | Age: 45
End: 2020-06-24
Payer: COMMERCIAL

## 2020-06-24 PROCEDURE — 99213 OFFICE O/P EST LOW 20 MIN: CPT | Performed by: INTERNAL MEDICINE

## 2020-06-24 RX ORDER — HYDROCODONE BITARTRATE AND ACETAMINOPHEN 5; 300 MG/1; MG/1
1 TABLET ORAL EVERY 6 HOURS PRN
Qty: 70 TABLET | Refills: 0 | Status: SHIPPED | OUTPATIENT
Start: 2020-06-24 | End: 2020-08-03 | Stop reason: SDUPTHER

## 2020-08-03 ENCOUNTER — VIRTUAL VISIT (OUTPATIENT)
Dept: PAIN MANAGEMENT | Age: 45
End: 2020-08-03
Payer: COMMERCIAL

## 2020-08-03 PROCEDURE — 99213 OFFICE O/P EST LOW 20 MIN: CPT | Performed by: INTERNAL MEDICINE

## 2020-08-03 RX ORDER — HYDROCODONE BITARTRATE AND ACETAMINOPHEN 5; 300 MG/1; MG/1
1 TABLET ORAL EVERY 6 HOURS PRN
Qty: 70 TABLET | Refills: 0 | Status: SHIPPED | OUTPATIENT
Start: 2020-08-03 | End: 2020-09-14 | Stop reason: SDUPTHER

## 2020-08-03 NOTE — PROGRESS NOTES
TELE HEALTH VISIT (AUDIO-VISUAL)    Pursuant to the emergency declaration under the [facility] waiver authority and the [employer] and [employer] Act, this Virtual  Visit was conducted, with patient's/legal guardian's consent, to reduce the patient's risk of exposure to COVID-19 and provide continuity of care for an established patient. Service is  provided through a video synchronous discussion virtually to substitute for in-person clinic visit. Due to this being a TeleHealth encounter (During NEKDM-91 public health emergency), evaluation of the following organ systems was limited: Vitals/Constitutional/EENT/Resp/CV/GI//MS/Neuro/Skin/Vaxr-Idpw-Gpg. Martínez Isak GRANT  1975  [phone]    Mr. Ivy Stallworth is being seen virtually for a follow up visit using one of the following techniques  Doxy. me   Informed verbal consent to the virtual visit was obtained from Mr. Ivy Stallworth. Risks associated with HIPPA compliance with the virtual visit was explained to the patient. Mr. Ivy Stallworth is at his residence and Dr. Dada Guevara is in his office. HISTORY OF PRESENT ILLNESS:  Mr. Ivy Stallworth is a 39 y.o. male  being assessed for a follow up visit for pain management for evaluation of ongoing care regarding his symptoms and monitoring of compliance with long term use high risk medications. He has a diagnosis of   1. [facility] Sprain of neck, initial encounter    2. [facility] Impingement syndrome of shoulder, laterality    . He complains of pain in the Neck, mid and low back   with radiation to the shoulders Right, arms Right, elbows Right and hands Right . He rates the pain 7/10 and describes it as sharp, aching, burning. Current treatment regimen has helped relieve about 70% of the pain. He denies any side effects from the current pain regimen.  Patient reports that since the last follow up visit the physical functioning is unchanged, family/social breathing. Neurological/Psychiatric:He is alert and oriented to person, place, and time. Coordination is  normal.  His mood isAppropriate and affect is Neutral/Euthymic(normal). Musculoskeletal / Extremities: Range of motion is normal. Gait is normal, assistive devices use: cane. IMPRESSION:   1. [facility] Sprain of neck, initial encounter    2. [facility] Impingement syndrome of shoulder, laterality    3. [facility] Impingement syndrome of shoulder, unspecified laterality        PLAN:  Informed verbal consent regarding treatment was obtained  -Continue with current opioid regimen Xodol 1-2 per day   -He was advised to increase fluids ( 5-7  glasses of fluid daily), limit caffeine, avoid cheese products, increase dietary fiber, increase activity and exercise as tolerated and relax regularly and enjoy meals   -he was advised proper sleep hygiene-told to avoid:use of caffeine or other stimulants after noon, alcohol use near bedtime, long or frequent naps during the day, erratic sleep schedule, heavy meals near bedtime, vigorous exercise near bedtime and use of electronic devices near bedtime   -Neck postural exercises given    Current Outpatient Medications   Medication Sig Dispense Refill    HYDROcodone-acetaminophen (XODOL) 5-300 MG TABS Take 1 tablet by mouth every 6 hours as needed for Pain (max 1-2/day) for up to 42 days. 70 tablet 0    esomeprazole (NEXIUM) 40 MG capsule Take 40 mg by mouth every morning (before breakfast). No current facility-administered medications for this visit. I will continue his current medication regimen  which is part of the above treatment schedule. It has been helping with Mr. Vale Oconnor chronic  medical problems which for this visit include:   Diagnoses of [facility] Sprain of neck, initial encounter and [facility] Impingement syndrome of shoulder, laterality were pertinent to this visit.    Risks and benefits of the medications and other alternative treatments  including no treatment were discussed with the patient. The common side effects of these medications were also explained to the patient. Informed verbal consent was obtained. Goals of current treatment regimen include improvement in pain, restoration of functioning- with focus on improvement in physical performance, general activity, work or disability,emotional distress, health care utilization and  decreased medication consumption. Will continue to monitor progress towards achieving/maintaining therapeutic goals with special emphasis on  1. Improvement in perceived interfernce  of pain with ADL's. Ability to do home exercises independently. Ability to do household chores indoor and/or outdoor work and social and leisure activities. Improve psychosocial and physical functioning. - he is showing progression towards this treatment goal with the current regimen. He was advised against drinking alcohol with the narcotic pain medicines, advised against driving or handling machinery while adjusting the dose of medicines or if having cognitive  issues related to the current medications. Risk of overdose and death, if medicines not taken as prescribed, were also discussed. If the patient develops new symptoms or if the symptoms worsen, the patient should call the office. While transcribing every attempt was made to maintain the accuracy of the note in terms of it's contents,there may have been some errors made inadvertently. Thank you for allowing me to participate in the care of this patient.     Johnny Ocampo MD.    Cc: [id number] [facility]

## 2020-09-14 ENCOUNTER — VIRTUAL VISIT (OUTPATIENT)
Dept: PAIN MANAGEMENT | Age: 45
End: 2020-09-14
Payer: COMMERCIAL

## 2020-09-14 PROCEDURE — 99213 OFFICE O/P EST LOW 20 MIN: CPT | Performed by: INTERNAL MEDICINE

## 2020-09-14 RX ORDER — HYDROCODONE BITARTRATE AND ACETAMINOPHEN 5; 300 MG/1; MG/1
1 TABLET ORAL EVERY 6 HOURS PRN
Qty: 70 TABLET | Refills: 0 | Status: SHIPPED | OUTPATIENT
Start: 2020-09-14 | End: 2020-10-26 | Stop reason: SDUPTHER

## 2020-09-14 NOTE — PROGRESS NOTES
and oriented to person, place, and time. Coordination is  normal.  His mood isAppropriate and affect is Neutral/Euthymic(normal). Musculoskeletal / Extremities: Range of motion is normal. Gait is normal, assistive devices use: none. IMPRESSION:   1. [facility] Sprain of neck, initial encounter    2. [facility] Impingement syndrome of shoulder, laterality    3. [facility] Impingement syndrome of shoulder, unspecified laterality        PLAN:  Informed verbal consent regarding treatment was obtained  -continue with current opioid regimen Xodol 1-2 per day  -He was advised to increase fluids ( 5-7  glasses of fluid daily), limit caffeine, avoid cheese products, increase dietary fiber, increase activity and exercise as tolerated and relax regularly and enjoy meals   -Adv Biofeedback, relaxation and meditation techniques. Referral to psychologist for CBT was also discussed with patient  -ROM exercises given   Current Outpatient Medications   Medication Sig Dispense Refill    HYDROcodone-acetaminophen (XODOL) 5-300 MG TABS Take 1 tablet by mouth every 6 hours as needed for Pain (max 1-2/day) for up to 42 days. 70 tablet 0    esomeprazole (NEXIUM) 40 MG capsule Take 40 mg by mouth every morning (before breakfast). No current facility-administered medications for this visit. I will continue his current medication regimen  which is part of the above treatment schedule. It has been helping with Mr. Ligia Taylor chronic  medical problems which for this visit include:   Diagnoses of [facility] Sprain of neck, initial encounter and [facility] Impingement syndrome of shoulder, laterality were pertinent to this visit. Risks and benefits of the medications and other alternative treatments  including no treatment were discussed with the patient. The common side effects of these medications were also explained to the patient. Informed verbal consent was obtained.    Goals of current treatment regimen include improvement in pain, restoration of functioning- with focus on improvement in physical performance, general activity, work or disability,emotional distress, health care utilization and  decreased medication consumption. Will continue to monitor progress towards achieving/maintaining therapeutic goals with special emphasis on  1. Improvement in perceived interfernce  of pain with ADL's. Ability to do home exercises independently. Ability to do household chores indoor and/or outdoor work and social and leisure activities. Improve psychosocial and physical functioning. - he is showing progression towards this treatment goal with the current regimen. He was advised against drinking alcohol with the narcotic pain medicines, advised against driving or handling machinery while adjusting the dose of medicines or if having cognitive  issues related to the current medications. Risk of overdose and death, if medicines not taken as prescribed, were also discussed. If the patient develops new symptoms or if the symptoms worsen, the patient should call the office. While transcribing every attempt was made to maintain the accuracy of the note in terms of it's contents,there may have been some errors made inadvertently. Thank you for allowing me to participate in the care of this patient.     Sheng العلي MD.    Cc: [id number] [facility]

## 2020-10-26 ENCOUNTER — VIRTUAL VISIT (OUTPATIENT)
Dept: PAIN MANAGEMENT | Age: 45
End: 2020-10-26
Payer: COMMERCIAL

## 2020-10-26 PROCEDURE — 99213 OFFICE O/P EST LOW 20 MIN: CPT | Performed by: INTERNAL MEDICINE

## 2020-10-26 RX ORDER — HYDROCODONE BITARTRATE AND ACETAMINOPHEN 5; 300 MG/1; MG/1
1 TABLET ORAL EVERY 6 HOURS PRN
Qty: 70 TABLET | Refills: 0 | Status: SHIPPED | OUTPATIENT
Start: 2020-10-26 | End: 2020-12-07 | Stop reason: SDUPTHER

## 2020-10-26 NOTE — PROGRESS NOTES
TELE HEALTH VISIT (AUDIO-VISUAL)    Pursuant to the emergency declaration under the [facility] waiver authority and the [employer] Act, this Virtual  Visit was conducted, with patient's/legal guardian's consent, to reduce the patient's risk of exposure to COVID-19 and provide continuity of care for an established patient. Service is  provided through a video synchronous discussion virtually to substitute for in-person clinic visit. Due to this being a TeleHealth encounter (During KFU-91 public health emergency), evaluation of the following organ systems was limited: Vitals/Constitutional/EENT/Resp/CV/GI//MS/Neuro/Skin/Sfba-Qmus-Pen. Charolotte Nyhan II  1975  [id number]    Mr. Anushka Lewis is being seen virtually for a follow up visit using one of the following techniques  Doxy. me  Informed verbal consent to the virtual visit was obtained from Mr. Anushka Lewis. Risks associated with HIPPA compliance with the virtual visit was explained to the patient. Mr. Anushka Lewis is at his residence and Dr. Sina Galarza is in his office. HISTORY OF PRESENT ILLNESS:  Mr. Anushak Lewis is a 39 y.o. male  being assessed for a follow up visit for pain management for evaluation of ongoing care regarding his symptoms and monitoring of compliance with long term use high risk medications. He has a diagnosis of   1. BWC Sprain of neck, initial encounter    2. bwc Impingement syndrome of shoulder, laterality    . He complains of pain in the upper, mid and lower back  with radiation to the neck, shoulders Right and arms Right . He rates the pain 7/10 and describes it as aching. Current treatment regimen has helped relieve about 70% of the pain. He denies any side effects from the current pain regimen.  Patient reports that since the last follow up visit the physical functioning is unchanged, family/social relationships are unchanged, mood is unchanged sleep patterns are unchanged, and that the overall functioning is unchanged. Patient denies misusing/abusing his narcotic pain medications or using any illegal drugs. There are No indicators for possible drug abuse, addiction or diversion problems. Mr. Jessa Mello states he has been doing fair, pain has been baseline. Patient is complaining of pain in the neck, shoulder and upper back, he states the pain has been getting worse over the year. He says his right shoulder and arms are getting weaker. Patient mention he is using Xodol 1-2 per day. ALLERGIES: Patients list of allergies were reviewed     MEDICATIONS: Mr. Jessa Mello list of medications were reviewed. His current medications are   Outpatient Medications Prior to Visit   Medication Sig Dispense Refill    HYDROcodone-acetaminophen (XODOL) 5-300 MG TABS Take 1 tablet by mouth every 6 hours as needed for Pain (max 1-2/day) for up to 42 days. 70 tablet 0    esomeprazole (NEXIUM) 40 MG capsule Take 40 mg by mouth every morning (before breakfast). No facility-administered medications prior to visit. SOCIAL/FAMILY/PAST MEDICAL HISTORY: Mr. Gilbert Luna, family and past medical history was reviewed. REVIEW OF SYSTEMS:    Respiratory: Negative for apnea, chest tightness and shortness of breath or change in baseline breathing. Gastrointestinal: Negative for nausea, vomiting, abdominal pain, diarrhea, constipation, blood in stool and abdominal distention. PHYSICAL EXAM:   Nursing note and vitals per patient reviewed. There were no vitals taken for this visit. Constitutional: He appears well-developed and well-nourished. No acute distress. No respiratory distress. Skin: Skin appears to be warm and dry. No rashes or any other marks noted. He is not diaphoretic.   Respiratory/Pulmonary: NO conversational dyspnea, no accessory muscle use, no coughing during exam. He does not appear to be in labored breathing. Neurological/Psychiatric:He is alert and oriented to person, place, and time. Coordination is  normal.  His mood isAppropriate and affect is Neutral/Euthymic(normal). Musculoskeletal / Extremities: Range of motion is normal. Gait is normal, assistive devices use: none. IMPRESSION:   1. BWC Sprain of neck, initial encounter    2. bwc Impingement syndrome of shoulder, laterality    3. bwc Impingement syndrome of shoulder, unspecified laterality        PLAN:  Informed verbal consent regarding treatment was obtained  [place] record was obtained and reviewed  for the last one year and no indicators of drug misuse  were found. Any other controlled substance prescriptions  seen on the record have been accounted for, I am aware of the patient receiving these medications. [facility] [place] record will be rechecked as part of office protocol. continue with current opioid regimen continue with Lisa Payan  -He was advised to increase fluids ( 5-7  glasses of fluid daily), limit caffeine, avoid cheese products, increase dietary fiber, increase activity and exercise as tolerated and relax regularly and enjoy meals    Current Outpatient Medications   Medication Sig Dispense Refill    HYDROcodone-acetaminophen (XODOL) 5-300 MG TABS Take 1 tablet by mouth every 6 hours as needed for Pain (max 1-2/day) for up to 42 days. 70 tablet 0    esomeprazole (NEXIUM) 40 MG capsule Take 40 mg by mouth every morning (before breakfast). No current facility-administered medications for this visit. I will continue his current medication regimen  which is part of the above treatment schedule. It has been helping with Mr. Carmelina Sacks chronic  medical problems which for this visit include:   Diagnoses of BWC Sprain of neck, initial encounter and bwc Impingement syndrome of shoulder, laterality were pertinent to this visit.    Risks and benefits of the medications and other alternative treatments  including no treatment were discussed with the patient. The common side effects of these medications were also explained to the patient. Informed verbal consent was obtained. Goals of current treatment regimen include improvement in pain, restoration of functioning- with focus on improvement in physical performance, general activity, work or disability,emotional distress, health care utilization and  decreased medication consumption. Will continue to monitor progress towards achieving/maintaining therapeutic goals with special emphasis on  1. Improvement in perceived interfernce  of pain with ADL's. Ability to do home exercises independently. Ability to do household chores indoor and/or outdoor work and social and leisure activities. Improve psychosocial and physical functioning. - he is showing progression towards this treatment goal with the current regimen. He was advised against drinking alcohol with the narcotic pain medicines, advised against driving or handling machinery while adjusting the dose of medicines or if having cognitive  issues related to the current medications. Risk of overdose and death, if medicines not taken as prescribed, were also discussed. If the patient develops new symptoms or if the symptoms worsen, the patient should call the office. While transcribing every attempt was made to maintain the accuracy of the note in terms of it's contents,there may have been some errors made inadvertently. Thank you for allowing me to participate in the care of this patient.     Leonora Barton MD.    Cc: [id number]

## 2020-12-07 ENCOUNTER — OFFICE VISIT (OUTPATIENT)
Dept: PAIN MANAGEMENT | Age: 45
End: 2020-12-07
Payer: COMMERCIAL

## 2020-12-07 VITALS
DIASTOLIC BLOOD PRESSURE: 76 MMHG | WEIGHT: 230 LBS | HEART RATE: 79 BPM | BODY MASS INDEX: 30.34 KG/M2 | TEMPERATURE: 98.4 F | SYSTOLIC BLOOD PRESSURE: 118 MMHG

## 2020-12-07 PROCEDURE — 99213 OFFICE O/P EST LOW 20 MIN: CPT | Performed by: INTERNAL MEDICINE

## 2020-12-07 RX ORDER — HYDROCODONE BITARTRATE AND ACETAMINOPHEN 5; 300 MG/1; MG/1
1 TABLET ORAL EVERY 6 HOURS PRN
Qty: 70 TABLET | Refills: 0 | Status: SHIPPED | OUTPATIENT
Start: 2020-12-07 | End: 2021-01-18 | Stop reason: SDUPTHER

## 2020-12-07 NOTE — PROGRESS NOTES
Bhumi Nolasco   1975  [id number]      HISTORY OF PRESENT ILLNESS:  Mr. Abelardo Escobar is a 39 y.o. male returns for a follow up visit for pain management  He has a diagnosis of   1. BWC Sprain of neck, initial encounter    2. bwc Impingement syndrome of shoulder, laterality    . He complains of pain in the head, neck with radiation to the lower back, right shoulder with radiation to the right elbow He rates the pain 8/10 and describes it as aching, burning, numbness. Current treatment regimen has helped relieve about 80% of the pain. He denies any side effects from the current pain regimen. Patient reports that since the last follow up visit the physical functioning is unchanged, family/social relationships are better, mood is unchanged sleep patterns are unchanged, and that the overall functioning is better. Patient denies misusing/abusing his narcotic pain medications or using any illegal drugs. There are No indicators for possible drug abuse, addiction or diversion problems. Mr. Abelardo Escobar states he has been doing fair, pain has been manageable with the medications. He mentions he is using Xodol 1-2 per day. Patient states his right shoulder and upper arm hurts especially on using it. Patient reports he has been managing his ADL's and house chores. Patient denies any constipation symptoms. He says he is using Ibuprofen PRN. He states he was started on Cialis for BPH. ALLERGIES: Patients list of allergies were reviewed     MEDICATIONS: Mr. Abelardo Escobar list of medications were reviewed. His current medications are   Outpatient Medications Prior to Visit   Medication Sig Dispense Refill    HYDROcodone-acetaminophen (XODOL) 5-300 MG TABS Take 1 tablet by mouth every 6 hours as needed for Pain (max 1-2/day) for up to 42 days. 70 tablet 0    esomeprazole (NEXIUM) 40 MG capsule Take 40 mg by mouth every morning (before breakfast). No facility-administered medications prior to visit.          REVIEW OF SYSTEMS:    Respiratory: Negative for apnea, chest tightness and shortness of breath or change in baseline breathing. PHYSICAL EXAM:   Nursing note and vitals reviewed. /76   Pulse 79   Temp 98.4 °F (36.9 °C) (Infrared)   Wt 230 lb (104.3 kg)   BMI 30.34 kg/m²   Constitutional: He appears well-developed and well-nourished. No acute distress. Cardiovascular: Normal rate, regular rhythm, normal heart sounds, and does not have murmur. Pulmonary/Chest: Effort normal. No respiratory distress. He does not have wheezes in the lung fields. He has no rales. Neurological/Psychiatric:He is alert and oriented to person, place, and time. Coordination is  normal.  His mood isAppropriate and affect is Neutral/Euthymic(normal) . His    IMPRESSION:   1. BWC Sprain of neck, initial encounter    2. bwc Impingement syndrome of shoulder, laterality    3. bwc Impingement syndrome of shoulder, unspecified laterality        PLAN:  Informed verbal consent was obtained  -continue with current opioid regimen Xodol 1-2 per day  -He was advised to increase fluids ( 5-7  glasses of fluid daily), limit caffeine, avoid cheese products, increase dietary fiber, increase activity and exercise as tolerated and relax regularly and enjoy meals   -advised to walk 30 minutes daily as tolerated  -Urine drug screen with GC/MS for opiates and drugs of abuse was ordered and will follow up on results. Current Outpatient Medications   Medication Sig Dispense Refill    HYDROcodone-acetaminophen (XODOL) 5-300 MG TABS Take 1 tablet by mouth every 6 hours as needed for Pain (max 1-2/day) for up to 42 days. 70 tablet 0    esomeprazole (NEXIUM) 40 MG capsule Take 40 mg by mouth every morning (before breakfast). No current facility-administered medications for this visit. I will continue his current medication regimen  which is part of the above treatment schedule.  It has been helping with Mr. Ferris Maryanne chronic  medical problems which for this visit include:   Diagnoses of [facility] Sprain of neck, initial encounter and [facility] Impingement syndrome of shoulder, laterality were pertinent to this visit. Risks and benefits of the medications and other alternative treatments  including no treatment were discussed with the patient. The common side effects of these medications were also explained to the patient. Informed verbal consent was obtained. Goals of current treatment regimen include improvement in pain, restoration of functioning- with focus on improvement in physical performance, general activity, work or disability,emotional distress, health care utilization and  decreased medication consumption. Will continue to monitor progress towards achieving/maintaining therapeutic goals with special emphasis on  1. Improvement in perceived interfernce  of pain with ADL's. Ability to do home exercises independently. Ability to do household chores indoor and/or outdoor work and social and leisure activities. Improve psychosocial and physical functioning. - he is showing progression towards this treatment goal with the current regimen. He was advised against drinking alcohol with the narcotic pain medicines, advised against driving or handling machinery while adjusting the dose of medicines or if having cognitive  issues related to the current medications. Risk of overdose and death, if medicines not taken as prescribed, were also discussed. If the patient develops new symptoms or if the symptoms worsen, the patient should call the office. While transcribing every attempt was made to maintain the accuracy of the note in terms of it's contents,there may have been some errors made inadvertently. Thank you for allowing me to participate in the care of this patient.     Alesha Ortega MD.    Cc: [id number]

## 2021-01-18 ENCOUNTER — VIRTUAL VISIT (OUTPATIENT)
Dept: PAIN MANAGEMENT | Age: 46
End: 2021-01-18
Payer: COMMERCIAL

## 2021-01-18 DIAGNOSIS — M75.40 IMPINGEMENT SYNDROME OF SHOULDER, UNSPECIFIED LATERALITY: ICD-10-CM

## 2021-01-18 DIAGNOSIS — S13.9XXA SPRAIN OF NECK, INITIAL ENCOUNTER: ICD-10-CM

## 2021-01-18 PROCEDURE — 99213 OFFICE O/P EST LOW 20 MIN: CPT | Performed by: INTERNAL MEDICINE

## 2021-01-18 RX ORDER — HYDROCODONE BITARTRATE AND ACETAMINOPHEN 5; 300 MG/1; MG/1
1 TABLET ORAL EVERY 6 HOURS PRN
Qty: 70 TABLET | Refills: 0 | Status: SHIPPED | OUTPATIENT
Start: 2021-01-18 | End: 2021-03-01 | Stop reason: SDUPTHER

## 2021-01-18 NOTE — PROGRESS NOTES
TELE HEALTH VISIT (AUDIO-VISUAL)    Pursuant to the emergency declaration under the [address] [facility] waiver authority and the [employer] Act, this Virtual  Visit was conducted, with patient's/legal guardian's consent, to reduce the patient's risk of exposure to COVID-19 and provide continuity of care for an established patient. Service is  provided through a video synchronous discussion virtually to substitute for in-person clinic visit. Due to this being a TeleHealth encounter (During QSIVM-36 public health emergency), evaluation of the following organ systems was limited: Vitals/Constitutional/EENT/Resp/CV/GI//MS/Neuro/Skin/Wqup-Iwan-Kyw. Yaw Miner II  1975  [id number]    Mr. Enedina Murphy is being seen virtually for a follow up visit using one of the following techniques  Doxy. me  Informed verbal consent to the virtual visit was obtained from Mr. Enedina Murphy. Risks associated with HIPPA compliance with the virtual visit was explained to the patient. Mr. Enedina Murphy is at his residence and Dr. Desi Blake is in his office. HISTORY OF PRESENT ILLNESS:  Mr. Enedina Murphy is a 39 y.o. male returns for a follow up visit for multiple medical problems. His current presenting problems are   1. BWC Sprain of neck, initial encounter    2. bwc Impingement syndrome of shoulder, laterality    3. bwc Impingement syndrome of shoulder, unspecified laterality    . As per information/history obtained from the PADT(patient assessment and documentation tool) - He complains of pain in the neck, upper back, mid back and lower back with radiation to the shoulders Right, arms Right, elbows Right, hands Right and knees Left He rates the pain 7/10 and describes it as aching. Pain is made worse by: movement, walking, standing, sitting, bending, lifting. Current treatment regimen has helped relieve about 70% of the pain.   He denies side effects from the current pain regimen. Patient reports that since the last follow up visit the physical functioning is unchanged, family/social relationships are unchanged, mood is unchanged and sleep patterns are unchanged, and that the overall functioning is unchanged. Patient denies neurological bowel or bladder. Patient denies misusing/abusing his narcotic pain medications or using any illegal drugs. There are No indicators for possible drug abuse, addiction or diversion problems. Upon obtaining the medical history from Mr. Retha Lesch regarding today's office visit for his presenting problems, patient states he has been doing fair, pian has been baseline. He mentions he is using Norco 1-2 per day. He complains his shoulder, arm and neck hurts. He reports he has been complaint with his regimen. He says he is using OTC Ibuprofen. He denies any constipation symptoms. He says he is working full time still. ALLERGIES: Patients list of allergies were reviewed     MEDICATIONS: Mr. Retha Lesch list of medications were reviewed. His current medications are   Outpatient Medications Prior to Visit   Medication Sig Dispense Refill    esomeprazole (NEXIUM) 40 MG capsule Take 40 mg by mouth every morning (before breakfast).  HYDROcodone-acetaminophen (XODOL) 5-300 MG TABS Take 1 tablet by mouth every 6 hours as needed for Pain (max 1-2/day) for up to 42 days. 70 tablet 0     No facility-administered medications prior to visit. REVIEW OF SYSTEMS:    Respiratory: Negative for apnea, chest tightness and shortness of breath or change in baseline breathing. PHYSICAL EXAM:   Nursing note and vitals reviewed. There were no vitals taken for this visit. Constitutional: He appears well-developed and well-nourished. No acute distress. Cardiovascular: Normal rate, regular rhythm, normal heart sounds, and does not have murmur. Pulmonary/Chest: Effort normal. No respiratory distress.  He does not have wheezes in the lung fields. He has no rales. Neurological/Psychiatric:He is alert and oriented to person, place, and time. Coordination is  normal.  His mood isAppropriate and affect is Neutral/Euthymic(normal) . IMPRESSION:   1. BWC Sprain of neck, initial encounter    2. bwc Impingement syndrome of shoulder, laterality    3. bwc Impingement syndrome of shoulder, unspecified laterality        PLAN:  Informed verbal consent was obtained  -Continue with current opioid regimen Norco 1-2 per day   -He was advised to increase fluids ( 5-7  glasses of fluid daily), limit caffeine, avoid cheese products, increase dietary fiber, increase activity and exercise as tolerated and relax regularly and enjoy meals   -Patient's urine drug screen results with GC/MS confirmation were obtained and reviewed and were negative for any illicit drugs. Prescribed medications were within acceptable range. -ROM/Stretching exercises as advised    Current Outpatient Medications   Medication Sig Dispense Refill    HYDROcodone-acetaminophen (XODOL) 5-300 MG TABS Take 1 tablet by mouth every 6 hours as needed for Pain (max 1-2/day) for up to 42 days. 70 tablet 0    esomeprazole (NEXIUM) 40 MG capsule Take 40 mg by mouth every morning (before breakfast). No current facility-administered medications for this visit. I will continue his current medication regimen  which is part of the above treatment schedule. It has been helping with Mr. Lucila Byrd chronic  medical problems which for this visit include:   Diagnoses of BWC Sprain of neck, initial encounter, bwc Impingement syndrome of shoulder, laterality, and bwc Impingement syndrome of shoulder, unspecified laterality were pertinent to this visit. Risks and benefits of the medications and other alternative treatments  including no treatment were discussed with the patient. The common side effects of these medications were also explained to the patient. Informed verbal consent was obtained. Goals of current treatment regimen include improvement in pain, restoration of functioning- with focus on improvement in physical performance, general activity, work or disability,emotional distress, health care utilization and  decreased medication consumption. Will continue to monitor progress towards achieving/maintaining therapeutic goals with special emphasis on  1. Improvement in perceived interfernce  of pain with ADL's. Ability to do home exercises independently. Ability to do household chores indoor and/or outdoor work and social and leisure activities. Improve psychosocial and physical functioning. - he is showing progression towards this treatment goal with the current regimen. He was advised against drinking alcohol with the narcotic pain medicines, advised against driving or handling machinery while adjusting the dose of medicines or if having cognitive  issues related to the current medications. Risk of overdose and death, if medicines not taken as prescribed, were also discussed. If the patient develops new symptoms or if the symptoms worsen, the patient should call the office. While transcribing every attempt was made to maintain the accuracy of the note in terms of it's contents,there may have been some errors made inadvertently. Thank you for allowing me to participate in the care of this patient.     Jose Luis Terry MD.    Cc: [id number]

## 2021-03-01 ENCOUNTER — VIRTUAL VISIT (OUTPATIENT)
Dept: PAIN MANAGEMENT | Age: 46
End: 2021-03-01
Payer: COMMERCIAL

## 2021-03-01 DIAGNOSIS — M75.40 IMPINGEMENT SYNDROME OF SHOULDER, UNSPECIFIED LATERALITY: ICD-10-CM

## 2021-03-01 DIAGNOSIS — S13.9XXA SPRAIN OF NECK, INITIAL ENCOUNTER: ICD-10-CM

## 2021-03-01 PROCEDURE — 99213 OFFICE O/P EST LOW 20 MIN: CPT | Performed by: INTERNAL MEDICINE

## 2021-03-01 RX ORDER — HYDROCODONE BITARTRATE AND ACETAMINOPHEN 5; 300 MG/1; MG/1
1 TABLET ORAL EVERY 6 HOURS PRN
Qty: 70 TABLET | Refills: 0 | Status: SHIPPED | OUTPATIENT
Start: 2021-03-01 | End: 2021-04-12 | Stop reason: SDUPTHER

## 2021-03-01 NOTE — PROGRESS NOTES
TELE HEALTH VISIT (AUDIO-VISUAL)    Pursuant to the emergency declaration under the [facility], [facility] waiver authority and the [employer] and [employer] Act, this Virtual  Visit was conducted, with patient's/legal guardian's consent, to reduce the patient's risk of exposure to COVID-19 and provide continuity of care for an established patient. Service is  provided through a video synchronous discussion virtually to substitute for in-person clinic visit. Due to this being a TeleHealth encounter (During RDOAA-77 public health emergency), evaluation of the following organ systems was limited: Vitals/Constitutional/EENT/Resp/CV/GI//MS/Neuro/Skin/Lkyq-Nfgo-Dbq. Nirali Tanner II  1975  [id number]    Mr. Connie Lopez is being seen virtually for a follow up visit using one of the following techniques  Google Duo, Face time or Doxy. me  Informed verbal consent to the virtual visit was obtained from Mr. Connie Lopez. Risks associated with HIPPA compliance with the virtual visit was explained to the patient. Mr. Connie Lopez is at his residence and Dr. Chana Reynolds is in his office. HISTORY OF PRESENT ILLNESS:  Mr. Connie Lopez is a 39 y.o. male returns for a follow up visit for multiple medical problems. His current presenting problems are No diagnosis found. .    As per information/history obtained from the PADT(patient assessment and documentation tool) - He complains of pain in the neck, upper back, mid back and lower back with radiation to the shoulders Right, arms Right, elbows Right and hands Right He rates the pain 7/10 and describes it as aching, numbness. Pain is made worse by: movement, walking, standing, sitting, bending, lifting. Current treatment regimen has helped relieve about 70% of the pain. He denies side effects from the current pain regimen. Patient reports that since the last follow up visit the physical functioning is unchanged, family/social relationships are unchanged, mood is unchanged and sleep patterns are unchanged, and that the overall functioning is unchanged. Patient denies neurological bowel or bladder. Patient denies misusing/abusing his narcotic pain medications or using any illegal drugs. There are No indicators for possible drug abuse, addiction or diversion problems. Upon obtaining the medical history from Mr. Kerrie Tinsley regarding today's office visit for his presenting problems, patient states he has been doing fair. He reports he is laid off currently,but will return to work in a month. He mentions his pain has been about the same. He complains his back,neck and shoulder all hurting. He reports he is using OTC Ibuprofen with Xodol as needed. ALLERGIES: Patients list of allergies were reviewed     MEDICATIONS: Mr. Kerrie Tinsley list of medications were reviewed. His current medications are   Outpatient Medications Prior to Visit   Medication Sig Dispense Refill    HYDROcodone-acetaminophen (XODOL) 5-300 MG TABS Take 1 tablet by mouth every 6 hours as needed for Pain (max 1-2/day) for up to 42 days. 70 tablet 0    esomeprazole (NEXIUM) 40 MG capsule Take 40 mg by mouth every morning (before breakfast). No facility-administered medications prior to visit. REVIEW OF SYSTEMS:    Respiratory: Negative for apnea, chest tightness and shortness of breath or change in baseline breathing. PHYSICAL EXAM:   Nursing note and vitals reviewed. There were no vitals taken for this visit. Constitutional: He appears well-developed and well-nourished. No acute distress. Cardiovascular: Normal rate, regular rhythm, normal heart sounds, and does not have murmur. Pulmonary/Chest: Effort normal. No respiratory distress. He does not have wheezes in the lung fields. He has no rales. Neurological/Psychiatric:He is alert and oriented to person, place, and time.  Coordination is  normal.  His mood isAppropriate and affect is Neutral/Euthymic(normal) . His    IMPRESSION:   1. BWC Sprain of neck, initial encounter    2. bwc Impingement syndrome of shoulder, laterality    3. bwc Impingement syndrome of shoulder, unspecified laterality        PLAN:  Informed verbal consent was obtained  -[place] record was obtained and reviewed  for the last one year and no indicators of drug misuse  were found. Any other controlled substance prescriptions  seen on the record have been accounted for, I am aware of the patient receiving these medications. Sejal Self [place] record will be rechecked as part of office protocol.    -continue with current opioid regimen Xodol max 1-2 per day  -Labs ordered CBC, CMP  -neck and shoulder exercises as advised     Current Outpatient Medications   Medication Sig Dispense Refill    HYDROcodone-acetaminophen (XODOL) 5-300 MG TABS Take 1 tablet by mouth every 6 hours as needed for Pain (max 1-2/day) for up to 42 days. 70 tablet 0    esomeprazole (NEXIUM) 40 MG capsule Take 40 mg by mouth every morning (before breakfast). No current facility-administered medications for this visit. I will continue his current medication regimen  which is part of the above treatment schedule. It has been helping with Mr. Lenore Grant chronic  medical problems which for this visit include:   Diagnoses of BWC Sprain of neck, initial encounter and bwc Impingement syndrome of shoulder, laterality were pertinent to this visit. Risks and benefits of the medications and other alternative treatments  including no treatment were discussed with the patient. The common side effects of these medications were also explained to the patient. Informed verbal consent was obtained. Goals of current treatment regimen include improvement in pain, restoration of functioning- with focus on improvement in physical performance, general activity, work or disability,emotional distress, health care utilization and  decreased medication consumption.  Will continue to monitor progress towards achieving/maintaining therapeutic goals with special emphasis on  1. Improvement in perceived interfernce  of pain with ADL's. Ability to do home exercises independently. Ability to do household chores indoor and/or outdoor work and social and leisure activities. Improve psychosocial and physical functioning. - he is showing progression towards this treatment goal with the current regimen. He was advised against drinking alcohol with the narcotic pain medicines, advised against driving or handling machinery while adjusting the dose of medicines or if having cognitive  issues related to the current medications. Risk of overdose and death, if medicines not taken as prescribed, were also discussed. If the patient develops new symptoms or if the symptoms worsen, the patient should call the office. While transcribing every attempt was made to maintain the accuracy of the note in terms of it's contents,there may have been some errors made inadvertently. Thank you for allowing me to participate in the care of this patient.     Esperanza Alex MD.    Cc: [id number] [facility]

## 2021-04-12 ENCOUNTER — VIRTUAL VISIT (OUTPATIENT)
Dept: PAIN MANAGEMENT | Age: 46
End: 2021-04-12
Payer: COMMERCIAL

## 2021-04-12 DIAGNOSIS — M75.40 IMPINGEMENT SYNDROME OF SHOULDER, UNSPECIFIED LATERALITY: ICD-10-CM

## 2021-04-12 DIAGNOSIS — S13.9XXA SPRAIN OF NECK, INITIAL ENCOUNTER: ICD-10-CM

## 2021-04-12 PROCEDURE — 99213 OFFICE O/P EST LOW 20 MIN: CPT | Performed by: INTERNAL MEDICINE

## 2021-04-12 RX ORDER — HYDROCODONE BITARTRATE AND ACETAMINOPHEN 5; 300 MG/1; MG/1
1 TABLET ORAL EVERY 6 HOURS PRN
Qty: 70 TABLET | Refills: 0 | Status: SHIPPED | OUTPATIENT
Start: 2021-04-12 | End: 2021-05-13 | Stop reason: SDUPTHER

## 2021-04-12 NOTE — PROGRESS NOTES
TELE HEALTH VISIT (AUDIO-VISUAL)    Pursuant to the emergency declaration under the [address] [facility], [facility] waiver authority and the [employer] and [employer] Act, this Virtual  Visit was conducted, with patient's/legal guardian's consent, to reduce the patient's risk of exposure to COVID-19 and provide continuity of care for an established patient. Service is  provided through a video synchronous discussion virtually to substitute for in-person clinic visit. Due to this being a TeleHealth encounter (During ZBFDX-36 public health emergency), evaluation of the following organ systems was limited: Vitals/Constitutional/EENT/Resp/CV/GI//MS/Neuro/Skin/Xdae-Rzez-Edx. [facility]  1975  [id number]    Mr. Avni Le is being seen virtually for a follow up visit using one of the following techniques  Google Duo, Face time or Doxy. me  Informed verbal consent to the virtual visit was obtained from Mr. Avni Le. Risks associated with HIPPA compliance with the virtual visit was explained to the patient. Mr. Avni Le is at his residence and Dr. Richar Russell is in his office. HISTORY OF PRESENT ILLNESS:  Mr. Avni Le is a 39 y.o. male returns for a follow up visit for multiple medical problems. His current presenting problems are   1. [facility] Sprain of neck, initial encounter    2. [facility] Impingement syndrome of shoulder, laterality    . As per information/history obtained from the PADT(patient assessment and documentation tool) - He complains of pain in the neck, upper back, mid back and lower back with radiation to the shoulders Right, arms Right and elbows Right He rates the pain 7/10 and describes it as aching, numbness. Pain is made worse by: movement, walking, standing, sitting, bending, lifting. Current treatment regimen has helped relieve about 40% of the pain. He denies side effects from the current pain regimen. Patient reports that since Coordination is  normal.  His mood isAppropriate and affect is Neutral/Euthymic(normal) . His    IMPRESSION:   1. BWC Sprain of neck, initial encounter    2. bwc Impingement syndrome of shoulder, laterality    3. bwc Impingement syndrome of shoulder, unspecified laterality        PLAN:  Informed verbal consent was obtained  -continue with current opioid regimen Norco 1-2 per day  -He was advised to increase fluids ( 5-7  glasses of fluid daily), limit caffeine, avoid cheese products, increase dietary fiber, increase activity and exercise as tolerated and relax regularly and enjoy meals   -advised to walk 20-30 minutes daily as tolerated   -neck postural exercises as advised      Current Outpatient Medications   Medication Sig Dispense Refill    HYDROcodone-acetaminophen (XODOL) 5-300 MG TABS Take 1 tablet by mouth every 6 hours as needed for Pain (max 1-2/day) for up to 42 days. 70 tablet 0    esomeprazole (NEXIUM) 40 MG capsule Take 40 mg by mouth every morning (before breakfast). No current facility-administered medications for this visit. I will continue his current medication regimen  which is part of the above treatment schedule. It has been helping with Mr. Radha Shaver chronic  medical problems which for this visit include:   Diagnoses of BWC Sprain of neck, initial encounter and bwc Impingement syndrome of shoulder, laterality were pertinent to this visit. Risks and benefits of the medications and other alternative treatments  including no treatment were discussed with the patient. The common side effects of these medications were also explained to the patient. Informed verbal consent was obtained. Goals of current treatment regimen include improvement in pain, restoration of functioning- with focus on improvement in physical performance, general activity, work or disability,emotional distress, health care utilization and  decreased medication consumption.  Will continue to monitor progress towards achieving/maintaining therapeutic goals with special emphasis on  1. Improvement in perceived interfernce  of pain with ADL's. Ability to do home exercises independently. Ability to do household chores indoor and/or outdoor work and social and leisure activities. Improve psychosocial and physical functioning. - he is showing progression towards this treatment goal with the current regimen. 2. Ability to focus/concentrate at work and perform the duties required of him at work  Sit through a workday without lower extremity symptoms. Stand 30-60 minutes without lower extremity symptoms. Ability to lift up to 10-20 lbs. Ability to go up and down stairs. Sit 30-60 minutes  Without having to stand up frequently. - he is maintaining/progressing towards his work related goals with the current regimen. He was advised against drinking alcohol with the narcotic pain medicines, advised against driving or handling machinery while adjusting the dose of medicines or if having cognitive  issues related to the current medications. Risk of overdose and death, if medicines not taken as prescribed, were also discussed. If the patient develops new symptoms or if the symptoms worsen, the patient should call the office. While transcribing every attempt was made to maintain the accuracy of the note in terms of it's contents,there may have been some errors made inadvertently. Thank you for allowing me to participate in the care of this patient.     Jerilyn South MD.    Cc: [phone] [facility]

## 2021-05-01 ENCOUNTER — HOSPITAL ENCOUNTER (OUTPATIENT)
Age: 46
Discharge: HOME | End: 2021-05-01
Payer: COMMERCIAL

## 2021-05-01 DIAGNOSIS — M75.40 IMPINGEMENT SYNDROME OF SHOULDER, UNSPECIFIED LATERALITY: ICD-10-CM

## 2021-05-01 DIAGNOSIS — S13.9XXA SPRAIN OF NECK, INITIAL ENCOUNTER: ICD-10-CM

## 2021-05-01 LAB
A/G RATIO: 1.6 (ref 1.1–2.2)
ALBUMIN SERPL-MCNC: 4.8 G/DL (ref 3.4–5)
ALP BLD-CCNC: 91 U/L (ref 40–129)
ALT SERPL-CCNC: 30 U/L (ref 10–40)
ANION GAP SERPL CALCULATED.3IONS-SCNC: 12 MMOL/L (ref 3–16)
AST SERPL-CCNC: 33 U/L (ref 15–37)
BILIRUB SERPL-MCNC: 0.6 MG/DL (ref 0–1)
BUN BLDV-MCNC: 16 MG/DL (ref 7–20)
CALCIUM SERPL-MCNC: 9.1 MG/DL (ref 8.3–10.6)
CHLORIDE BLD-SCNC: 100 MMOL/L (ref 99–110)
CO2: 25 MMOL/L (ref 21–32)
CREAT SERPL-MCNC: 1 MG/DL (ref 0.9–1.3)
GFR AFRICAN AMERICAN: >60
GFR NON-AFRICAN AMERICAN: >60
GLOBULIN: 3 G/DL
GLUCOSE BLD-MCNC: 113 MG/DL (ref 70–99)
HCT VFR BLD CALC: 44.1 % (ref 40.5–52.5)
HEMOGLOBIN: 15.3 G/DL (ref 13.5–17.5)
MCH RBC QN AUTO: 30.8 PG (ref 26–34)
MCHC RBC AUTO-ENTMCNC: 34.8 G/DL (ref 31–36)
MCV RBC AUTO: 88.6 FL (ref 80–100)
PDW BLD-RTO: 12.8 % (ref 12.4–15.4)
PLATELET # BLD: 304 K/UL (ref 135–450)
PMV BLD AUTO: 7.6 FL (ref 5–10.5)
POTASSIUM SERPL-SCNC: 4 MMOL/L (ref 3.5–5.1)
RBC # BLD: 4.97 M/UL (ref 4.2–5.9)
SODIUM BLD-SCNC: 137 MMOL/L (ref 136–145)
TOTAL PROTEIN: 7.8 G/DL (ref 6.4–8.2)
WBC # BLD: 5.4 K/UL (ref 4–11)

## 2021-05-01 PROCEDURE — 85027 COMPLETE CBC AUTOMATED: CPT

## 2021-05-01 PROCEDURE — 80053 COMPREHEN METABOLIC PANEL: CPT

## 2021-05-01 PROCEDURE — 36415 COLL VENOUS BLD VENIPUNCTURE: CPT

## 2021-05-13 ENCOUNTER — VIRTUAL VISIT (OUTPATIENT)
Dept: PAIN MANAGEMENT | Age: 46
End: 2021-05-13
Payer: COMMERCIAL

## 2021-05-13 DIAGNOSIS — M75.40 IMPINGEMENT SYNDROME OF SHOULDER, UNSPECIFIED LATERALITY: ICD-10-CM

## 2021-05-13 DIAGNOSIS — S13.9XXA SPRAIN OF NECK, INITIAL ENCOUNTER: ICD-10-CM

## 2021-05-13 PROCEDURE — 99213 OFFICE O/P EST LOW 20 MIN: CPT | Performed by: INTERNAL MEDICINE

## 2021-05-13 RX ORDER — HYDROCODONE BITARTRATE AND ACETAMINOPHEN 5; 300 MG/1; MG/1
1 TABLET ORAL EVERY 6 HOURS PRN
Qty: 70 TABLET | Refills: 0 | Status: SHIPPED | OUTPATIENT
Start: 2021-05-13 | End: 2021-06-22 | Stop reason: SDUPTHER

## 2021-05-13 NOTE — PROGRESS NOTES
relationships are better, mood is better and sleep patterns are better, and that the overall functioning is better. Patient denies neurological bowel or bladder. Patient denies misusing/abusing his narcotic pain medications or using any illegal drugs. There are No indicators for possible drug abuse, addiction or diversion problems. Upon obtaining the medical history from Mr. Calista Vyas regarding today's office visit for his presenting problems, patient states he has been doing fair, managing his ADLs and house chores. He mentions he is working a new job starting next week. He says he is using Xodol as needed. He reports he is had labs done. ALLERGIES: Patients list of allergies were reviewed     MEDICATIONS: Mr. Calista Vyas list of medications were reviewed. His current medications are   Outpatient Medications Prior to Visit   Medication Sig Dispense Refill    HYDROcodone-acetaminophen (XODOL) 5-300 MG TABS Take 1 tablet by mouth every 6 hours as needed for Pain (max 1-2/day) for up to 42 days. 70 tablet 0    esomeprazole (NEXIUM) 40 MG capsule Take 40 mg by mouth every morning (before breakfast). No facility-administered medications prior to visit. REVIEW OF SYSTEMS:    Respiratory: Negative for apnea, chest tightness and shortness of breath or change in baseline breathing. PHYSICAL EXAM:   Nursing note and vitals reviewed. There were no vitals taken for this visit. Constitutional: He appears well-developed and well-nourished. No acute distress. Cardiovascular: Normal rate, regular rhythm, normal heart sounds, and does not have murmur. Pulmonary/Chest: Effort normal. No respiratory distress. He does not have wheezes in the lung fields. He has no rales. Neurological/Psychiatric:He is alert and oriented to person, place, and time. Coordination is  normal.  His mood isAppropriate and affect is Neutral/Euthymic(normal) and Inappropriate/Incongruent . His    IMPRESSION:   1.  [facility] Sprain of neck, initial encounter    2. bwc Impingement syndrome of shoulder, laterality    3. bwc Impingement syndrome of shoulder, unspecified laterality        PLAN:  Informed verbal consent was obtained  -continue with current opioid regimen Xodol 1-2 per day  -Most recent labs were reviewed and shows; elevated Glucose, the rest are within normal limits. (Pre diabetic range)   -He was advised to increase fluids ( 5-7  glasses of fluid daily), limit caffeine, avoid cheese products, increase dietary fiber, increase activity and exercise as tolerated and relax regularly and enjoy meals   Current Outpatient Medications   Medication Sig Dispense Refill    HYDROcodone-acetaminophen (XODOL) 5-300 MG TABS Take 1 tablet by mouth every 6 hours as needed for Pain (max 1-2/day) for up to 42 days. 70 tablet 0    esomeprazole (NEXIUM) 40 MG capsule Take 40 mg by mouth every morning (before breakfast). No current facility-administered medications for this visit. I will continue his current medication regimen  which is part of the above treatment schedule. It has been helping with Mr. Liz Grover chronic  medical problems which for this visit include:   Diagnoses of BWC Sprain of neck, initial encounter and bwc Impingement syndrome of shoulder, laterality were pertinent to this visit. Risks and benefits of the medications and other alternative treatments  including no treatment were discussed with the patient. The common side effects of these medications were also explained to the patient. Informed verbal consent was obtained. Goals of current treatment regimen include improvement in pain, restoration of functioning- with focus on improvement in physical performance, general activity, work or disability,emotional distress, health care utilization and  decreased medication consumption. Will continue to monitor progress towards achieving/maintaining therapeutic goals with special emphasis on  1.  Improvement in perceived interfernce  of pain with ADL's. Ability to do home exercises independently. Ability to do household chores indoor and/or outdoor work and social and leisure activities. Improve psychosocial and physical functioning. - he is showing progression towards this treatment goal with the current regimen. He was advised against drinking alcohol with the narcotic pain medicines, advised against driving or handling machinery while adjusting the dose of medicines or if having cognitive  issues related to the current medications. Risk of overdose and death, if medicines not taken as prescribed, were also discussed. If the patient develops new symptoms or if the symptoms worsen, the patient should call the office. While transcribing every attempt was made to maintain the accuracy of the note in terms of it's contents,there may have been some errors made inadvertently. Thank you for allowing me to participate in the care of this patient.     Jyoti Saldana MD.    Cc: [phone] [facility]

## 2021-06-22 ENCOUNTER — VIRTUAL VISIT (OUTPATIENT)
Dept: PAIN MANAGEMENT | Age: 46
End: 2021-06-22
Payer: COMMERCIAL

## 2021-06-22 DIAGNOSIS — M75.40 IMPINGEMENT SYNDROME OF SHOULDER, UNSPECIFIED LATERALITY: ICD-10-CM

## 2021-06-22 DIAGNOSIS — S13.9XXA SPRAIN OF NECK, INITIAL ENCOUNTER: ICD-10-CM

## 2021-06-22 PROCEDURE — 99213 OFFICE O/P EST LOW 20 MIN: CPT | Performed by: INTERNAL MEDICINE

## 2021-06-22 RX ORDER — HYDROCODONE BITARTRATE AND ACETAMINOPHEN 5; 300 MG/1; MG/1
1 TABLET ORAL EVERY 6 HOURS PRN
Qty: 60 TABLET | Refills: 0 | Status: SHIPPED | OUTPATIENT
Start: 2021-06-22 | End: 2021-08-03 | Stop reason: SDUPTHER

## 2021-06-22 NOTE — PROGRESS NOTES
TELE HEALTH VISIT (AUDIO-VISUAL)    Pursuant to the emergency declaration under the [facility], [facility] waiver authority and the [employer] Act, this Virtual  Visit was conducted, with patient's/legal guardian's consent, to reduce the patient's risk of exposure to COVID-19 and provide continuity of care for an established patient. Service is  provided through a video synchronous discussion virtually to substitute for in-person clinic visit. Due to this being a TeleHealth encounter (During [facility]- public health emergency), evaluation of the following organ systems was limited: Vitals/Constitutional/EENT/Resp/CV/GI//MS/Neuro/Skin/Vjuc-Ojpo-Prt. Florina Barone II  1975  [id number]    Mr. Bertram Rajan is being seen virtually for a follow up visit using one of the following techniques  Google Duo Face time Doxy. me  Informed verbal consent to the virtual visit was obtained from Mr. Bertram Rajan. Risks associated with HIPPA compliance with the virtual visit was explained to the patient. Mr. Bertram Rajan is at his residence and Dr. Corby Brush is in his office. HISTORY OF PRESENT ILLNESS:  Mr. Bertram Rajan is a 39 y.o. male returns for a follow up visit for multiple medical problems. His current presenting problems are   1. [facility] Sprain of neck, initial encounter    2. [facility] Impingement syndrome of shoulder, laterality    . As per information/history obtained from the PADT(patient assessment and documentation tool) - He complains of pain in the neck, shoulders Bilateral, elbows Bilateral, upper back, mid back and lower back with radiation to the upper back, mid back and lower back He rates the pain 7/10 and describes it as aching. Pain is made worse by: movement, walking, standing, sitting, bending, lying down, lifting. Current treatment regimen has helped relieve about 70% of the pain.   He denies side effects from the current pain regimen. Patient reports that since the last follow up visit the physical functioning is unchanged, family/social relationships are better, mood is unchanged and sleep patterns are unchanged, and that the overall functioning is better. Patient denies neurological bowel or bladder. Patient denies misusing/abusing his narcotic pain medications or using any illegal drugs. There are No indicators for possible drug abuse, addiction or diversion problems. Upon obtaining the medical history from Mr. Priya Tobias regarding today's office visit for his presenting problems, patient states she has been doing fair, pain has been baseline. She says she has been working out of town. She mentions she is using Xodol still 1-2 per day. She reports she is using Ibuprofen as needed. She denies any constipation symptoms. She complains her neck, shoulder, upper back hurts. ALLERGIES: Patients list of allergies were reviewed     MEDICATIONS: Mr. Priya Tobias list of medications were reviewed. His current medications are   Outpatient Medications Prior to Visit   Medication Sig Dispense Refill    HYDROcodone-acetaminophen (XODOL) 5-300 MG TABS Take 1 tablet by mouth every 6 hours as needed for Pain (max 1-2/day) for up to 42 days. 70 tablet 0    esomeprazole (NEXIUM) 40 MG capsule Take 40 mg by mouth every morning (before breakfast). No facility-administered medications prior to visit. REVIEW OF SYSTEMS:    Respiratory: Negative for apnea, chest tightness and shortness of breath or change in baseline breathing. PHYSICAL EXAM:   Nursing note and vitals reviewed. There were no vitals taken for this visit. Constitutional: He appears well-developed and well-nourished. No acute distress. Cardiovascular: Normal rate, regular rhythm, normal heart sounds, and does not have murmur. Pulmonary/Chest: Effort normal. No respiratory distress. He does not have wheezes in the lung fields. He has no rales. Neurological/Psychiatric:He is alert and oriented to person, place, and time. Coordination is  normal.  His mood isAppropriate and affect is Neutral/Euthymic(normal) . His    IMPRESSION:   1. [facility] Sprain of neck, initial encounter    2. bwc Impingement syndrome of shoulder, laterality    3. bwc Impingement syndrome of shoulder, unspecified laterality        PLAN:  Informed verbal consent was obtained  -[place] record was obtained and reviewed  for the last one year and no indicators of drug misuse  were found. Any other controlled substance prescriptions  seen on the record have been accounted for, I am aware of the patient receiving these medications. Alok Moreland [place] record will be rechecked as part of office protocol. -ROM/Stretching exercises as advised   -neck postural exercises given   -continue with Xodol 1-2 per day  -He was advised to increase fluids ( 5-7  glasses of fluid daily), limit caffeine, avoid cheese products, increase dietary fiber, increase activity and exercise as tolerated and relax regularly and enjoy meals      Current Outpatient Medications   Medication Sig Dispense Refill    HYDROcodone-acetaminophen (XODOL) 5-300 MG TABS Take 1 tablet by mouth every 6 hours as needed for Pain (max 1-2/day) for up to 42 days. 70 tablet 0    esomeprazole (NEXIUM) 40 MG capsule Take 40 mg by mouth every morning (before breakfast). No current facility-administered medications for this visit. I will continue his current medication regimen  which is part of the above treatment schedule. It has been helping with Mr. Ken Clark chronic  medical problems which for this visit include:   Diagnoses of B[facility] Sprain of neck, initial encounter and bwc Impingement syndrome of shoulder, laterality were pertinent to this visit. Risks and benefits of the medications and other alternative treatments  including no treatment were discussed with the patient. The common side effects of these medications were also explained to the patient. Informed verbal consent was obtained. Goals of current treatment regimen include improvement in pain, restoration of functioning- with focus on improvement in physical performance, general activity, work or disability,emotional distress, health care utilization and  decreased medication consumption. Will continue to monitor progress towards achieving/maintaining therapeutic goals with special emphasis on  1. Improvement in perceived interfernce  of pain with ADL's. Ability to do home exercises independently. Ability to do household chores indoor and/or outdoor work and social and leisure activities. Improve psychosocial and physical functioning. - he is showing progression towards this treatment goal with the current regimen. He was advised against drinking alcohol with the narcotic pain medicines, advised against driving or handling machinery while adjusting the dose of medicines or if having cognitive  issues related to the current medications. Risk of overdose and death, if medicines not taken as prescribed, were also discussed. If the patient develops new symptoms or if the symptoms worsen, the patient should call the office. While transcribing every attempt was made to maintain the accuracy of the note in terms of it's contents,there may have been some errors made inadvertently. Thank you for allowing me to participate in the care of this patient.     Aden Strauss MD.    Cc: [id number]

## 2021-06-23 ENCOUNTER — TELEPHONE (OUTPATIENT)
Dept: PAIN MANAGEMENT | Age: 46
End: 2021-06-23

## 2021-06-23 DIAGNOSIS — S13.9XXA SPRAIN OF NECK, INITIAL ENCOUNTER: ICD-10-CM

## 2021-06-23 DIAGNOSIS — M75.40 IMPINGEMENT SYNDROME OF SHOULDER, UNSPECIFIED LATERALITY: ICD-10-CM

## 2021-08-03 ENCOUNTER — OFFICE VISIT (OUTPATIENT)
Dept: PAIN MANAGEMENT | Age: 46
End: 2021-08-03
Payer: COMMERCIAL

## 2021-08-03 VITALS
DIASTOLIC BLOOD PRESSURE: 79 MMHG | HEART RATE: 69 BPM | BODY MASS INDEX: 29.9 KG/M2 | WEIGHT: 226.6 LBS | TEMPERATURE: 98.1 F | SYSTOLIC BLOOD PRESSURE: 127 MMHG

## 2021-08-03 DIAGNOSIS — M75.40 IMPINGEMENT SYNDROME OF SHOULDER, UNSPECIFIED LATERALITY: ICD-10-CM

## 2021-08-03 DIAGNOSIS — S13.9XXA SPRAIN OF NECK, INITIAL ENCOUNTER: ICD-10-CM

## 2021-08-03 PROCEDURE — 99213 OFFICE O/P EST LOW 20 MIN: CPT | Performed by: INTERNAL MEDICINE

## 2021-08-03 RX ORDER — HYDROCODONE BITARTRATE AND ACETAMINOPHEN 5; 300 MG/1; MG/1
1 TABLET ORAL EVERY 6 HOURS PRN
Qty: 60 TABLET | Refills: 0 | Status: SHIPPED | OUTPATIENT
Start: 2021-08-03 | End: 2021-09-14 | Stop reason: SDUPTHER

## 2021-08-03 NOTE — PROGRESS NOTES
Belén Aden II  1975  [id number]      HISTORY OF PRESENT ILLNESS:  Mr. Rachael Peralta is a 42 y.o. male returns for a follow up visit for pain management  He has a diagnosis of   1. B[facility] Sprain of neck, initial encounter    2. bwc Impingement syndrome of shoulder, laterality    . He complains of pain in the Neck, upper and low back, with radiation to right shoulder  He rates the pain 7/10 and describes it as sharp, aching, burning. Current treatment regimen has helped relieve about 70% of the pain. He denies any side effects from the current pain regimen. Patient reports that since the last follow up visit the physical functioning is unchanged, family/social relationships are unchanged, mood is unchanged sleep patterns are unchanged, and that the overall functioning is unchanged. Patient denies misusing/abusing his narcotic pain medications or using any illegal drugs. There are No indicators for possible drug abuse, addiction or diversion problems. Patient states he has been doing fair, he has been managing with the medications. Patient reports she is working 6 days a week. Mr. Rachael Peralta mentions he is using Xodol 1-2 per day PRN. Patient denies any constipation symptoms. He reports he has been managing all the ADL's and house chores. ALLERGIES: Patients list of allergies were reviewed     MEDICATIONS: Mr. Rachael Peralta list of medications were reviewed. His current medications are   Outpatient Medications Prior to Visit   Medication Sig Dispense Refill    HYDROcodone-acetaminophen (XODOL) 5-300 MG TABS Take 1 tablet by mouth every 6 hours as needed for Pain (max 1-2/day) for up to 42 days. 60 tablet 0    esomeprazole (NEXIUM) 40 MG capsule Take 40 mg by mouth every morning (before breakfast). No facility-administered medications prior to visit. REVIEW OF SYSTEMS:    Respiratory: Negative for apnea, chest tightness and shortness of breath or change in baseline breathing.       PHYSICAL EXAM: Nursing note and vitals reviewed. /79   Pulse 69   Temp 98.1 °F (36.7 °C)   Wt 226 lb 9.6 oz (102.8 kg)   BMI 29.90 kg/m²   Constitutional: He appears well-developed and well-nourished. No acute distress. Cardiovascular: Normal rate, regular rhythm, normal heart sounds, and does not have murmur. Pulmonary/Chest: Effort normal. No respiratory distress. He does not have wheezes in the lung fields. He has no rales. Neurological/Psychiatric:He is alert and oriented to person, place, and time. Coordination is  normal.  His mood isAppropriate and affect is Neutral/Euthymic(normal) . IMPRESSION:   1. BWC Sprain of neck, initial encounter    2. bwc Impingement syndrome of shoulder, laterality    3. bwc Impingement syndrome of shoulder, unspecified laterality        PLAN:  Informed verbal consent was obtained  -ROM/Stretching exercises for neck and shoulder   -He was advised to increase fluids ( 5-7  glasses of fluid daily), limit caffeine, avoid cheese products, increase dietary fiber, increase activity and exercise as tolerated and relax regularly and enjoy meals   -Neck postural exercises   -Continue with Xodal 1-2 per day    Current Outpatient Medications   Medication Sig Dispense Refill    HYDROcodone-acetaminophen (XODOL) 5-300 MG TABS Take 1 tablet by mouth every 6 hours as needed for Pain (max 1-2/day) for up to 42 days. 60 tablet 0    esomeprazole (NEXIUM) 40 MG capsule Take 40 mg by mouth every morning (before breakfast). No current facility-administered medications for this visit. I will continue his current medication regimen  which is part of the above treatment schedule. It has been helping with Mr. Reji Mathis chronic  medical problems which for this visit include:   Diagnoses of BWC Sprain of neck, initial encounter and bwc Impingement syndrome of shoulder, laterality were pertinent to this visit.    Risks and benefits of the medications and other alternative treatments including no treatment were discussed with the patient. The common side effects of these medications were also explained to the patient. Informed verbal consent was obtained. Goals of current treatment regimen include improvement in pain, restoration of functioning- with focus on improvement in physical performance, general activity, work or disability,emotional distress, health care utilization and  decreased medication consumption. Will continue to monitor progress towards achieving/maintaining therapeutic goals with special emphasis on  1. Improvement in perceived interfernce  of pain with ADL's. Ability to do home exercises independently. Ability to do household chores indoor and/or outdoor work and social and leisure activities. Improve psychosocial and physical functioning. - he is showing progression towards this treatment goal with the current regimen. He was advised against drinking alcohol with the narcotic pain medicines, advised against driving or handling machinery while adjusting the dose of medicines or if having cognitive  issues related to the current medications. Risk of overdose and death, if medicines not taken as prescribed, were also discussed. If the patient develops new symptoms or if the symptoms worsen, the patient should call the office. While transcribing every attempt was made to maintain the accuracy of the note in terms of it's contents,there may have been some errors made inadvertently. Thank you for allowing me to participate in the care of this patient.     Juanito Rey MD.    Cc: [id number]

## 2021-09-14 ENCOUNTER — OFFICE VISIT (OUTPATIENT)
Dept: PAIN MANAGEMENT | Age: 46
End: 2021-09-14
Payer: COMMERCIAL

## 2021-09-14 VITALS
DIASTOLIC BLOOD PRESSURE: 81 MMHG | OXYGEN SATURATION: 96 % | WEIGHT: 218 LBS | TEMPERATURE: 98.1 F | BODY MASS INDEX: 28.76 KG/M2 | HEART RATE: 86 BPM | SYSTOLIC BLOOD PRESSURE: 119 MMHG

## 2021-09-14 DIAGNOSIS — S13.9XXA SPRAIN OF NECK, INITIAL ENCOUNTER: ICD-10-CM

## 2021-09-14 DIAGNOSIS — M75.40 IMPINGEMENT SYNDROME OF SHOULDER, UNSPECIFIED LATERALITY: ICD-10-CM

## 2021-09-14 PROCEDURE — 99213 OFFICE O/P EST LOW 20 MIN: CPT | Performed by: INTERNAL MEDICINE

## 2021-09-14 RX ORDER — HYDROCODONE BITARTRATE AND ACETAMINOPHEN 5; 300 MG/1; MG/1
1 TABLET ORAL EVERY 6 HOURS PRN
Qty: 60 TABLET | Refills: 0 | Status: SHIPPED | OUTPATIENT
Start: 2021-09-14 | End: 2021-10-27 | Stop reason: SDUPTHER

## 2021-09-14 NOTE — PROGRESS NOTES
Brennen Cunningham RUDY  1975  [id number]      HISTORY OF PRESENT ILLNESS:  Mr. Oscar Carey is a 42 y.o. male returns for a follow up visit for pain management  He has a diagnosis of   1. BWC Sprain of neck, initial encounter    2. bwc Impingement syndrome of shoulder, laterality    . He complains of pain in the head, right shoulder, right elbow, neck with radiation to the lower back He rates the pain 7/10 and describes it as aching, numbness. Current treatment regimen has helped relieve about 80% of the pain. He denies any side effects from the current pain regimen. Patient reports that since the last follow up visit the physical functioning is unchanged, family/social relationships are unchanged, mood is unchanged sleep patterns are unchanged, and that the overall functioning is unchanged. Patient denies misusing/abusing his narcotic pain medications or using any illegal drugs. There are No indicators for possible drug abuse, addiction or diversion problems. Patient states he has been doing fair, his pain has been manageable. He mentions he is using Xodol 1-2 per day. Mr. Oscar Carey reports he is working 50-60 hours a week. He mentions he is using over the counter Analgesics. Patient denies any constipation symptoms. ALLERGIES: Patients list of allergies were reviewed     MEDICATIONS: Mr. Oscar Carey list of medications were reviewed. His current medications are   Outpatient Medications Prior to Visit   Medication Sig Dispense Refill    HYDROcodone-acetaminophen (XODOL) 5-300 MG TABS Take 1 tablet by mouth every 6 hours as needed for Pain (max 1-2/day) for up to 42 days. 60 tablet 0    esomeprazole (NEXIUM) 40 MG capsule Take 40 mg by mouth every morning (before breakfast). No facility-administered medications prior to visit. REVIEW OF SYSTEMS:    Respiratory: Negative for apnea, chest tightness and shortness of breath or change in baseline breathing.       PHYSICAL EXAM:   Nursing note and vitals reviewed. /81   Pulse 86   Temp 98.1 °F (36.7 °C) (Temporal)   Wt 218 lb (98.9 kg)   SpO2 96%   BMI 28.76 kg/m²   Constitutional: He appears well-developed and well-nourished. No acute distress. Cardiovascular: Normal rate, regular rhythm, normal heart sounds, and does not have murmur. Pulmonary/Chest: Effort normal. No respiratory distress. He does not have wheezes in the lung fields. He has no rales. Neurological/Psychiatric:He is alert and oriented to person, place, and time. Coordination is  normal.  His mood isAppropriate and affect is Neutral/Euthymic(normal) . His    IMPRESSION:   1. BWC Sprain of neck, initial encounter    2. bwc Impingement syndrome of shoulder, laterality    3. bwc Impingement syndrome of shoulder, unspecified laterality        PLAN:  Informed verbal consent was obtained  -Continue with current opioid regimen   -Continue with Xodol 1-2 per day  -Continue with OTC Analgesics   -He was advised to increase fluids ( 5-7  glasses of fluid daily), limit caffeine, avoid cheese products, increase dietary fiber, increase activity and exercise as tolerated and relax regularly and enjoy meals      Current Outpatient Medications   Medication Sig Dispense Refill    HYDROcodone-acetaminophen (XODOL) 5-300 MG TABS Take 1 tablet by mouth every 6 hours as needed for Pain (max 1-2/day) for up to 42 days. 60 tablet 0    esomeprazole (NEXIUM) 40 MG capsule Take 40 mg by mouth every morning (before breakfast). No current facility-administered medications for this visit. I will continue his current medication regimen  which is part of the above treatment schedule. It has been helping with Mr. Jelena Banerjee chronic  medical problems which for this visit include:   Diagnoses of BWC Sprain of neck, initial encounter and bwc Impingement syndrome of shoulder, laterality were pertinent to this visit.    Risks and benefits of the medications and other alternative treatments  including no treatment were discussed with the patient. The common side effects of these medications were also explained to the patient. Informed verbal consent was obtained. Goals of current treatment regimen include improvement in pain, restoration of functioning- with focus on improvement in physical performance, general activity, work or disability,emotional distress, health care utilization and  decreased medication consumption. Will continue to monitor progress towards achieving/maintaining therapeutic goals with special emphasis on  1. Improvement in perceived interfernce  of pain with ADL's. Ability to do home exercises independently. Ability to do household chores indoor and/or outdoor work and social and leisure activities. Improve psychosocial and physical functioning. - he is showing progression towards this treatment goal with the current regimen. He was advised against drinking alcohol with the narcotic pain medicines, advised against driving or handling machinery while adjusting the dose of medicines or if having cognitive  issues related to the current medications. Risk of overdose and death, if medicines not taken as prescribed, were also discussed. If the patient develops new symptoms or if the symptoms worsen, the patient should call the office. While transcribing every attempt was made to maintain the accuracy of the note in terms of it's contents,there may have been some errors made inadvertently. Thank you for allowing me to participate in the care of this patient.     Margie Keita MD.    Cc: [id number]

## 2021-10-27 ENCOUNTER — OFFICE VISIT (OUTPATIENT)
Dept: PAIN MANAGEMENT | Age: 46
End: 2021-10-27
Payer: COMMERCIAL

## 2021-10-27 VITALS
SYSTOLIC BLOOD PRESSURE: 107 MMHG | BODY MASS INDEX: 29.55 KG/M2 | HEART RATE: 83 BPM | DIASTOLIC BLOOD PRESSURE: 72 MMHG | WEIGHT: 224 LBS

## 2021-10-27 DIAGNOSIS — M75.40 IMPINGEMENT SYNDROME OF SHOULDER, UNSPECIFIED LATERALITY: ICD-10-CM

## 2021-10-27 DIAGNOSIS — S13.9XXA SPRAIN OF NECK, INITIAL ENCOUNTER: ICD-10-CM

## 2021-10-27 PROCEDURE — 99213 OFFICE O/P EST LOW 20 MIN: CPT | Performed by: INTERNAL MEDICINE

## 2021-10-27 RX ORDER — HYDROCODONE BITARTRATE AND ACETAMINOPHEN 5; 300 MG/1; MG/1
1 TABLET ORAL EVERY 6 HOURS PRN
Qty: 90 TABLET | Refills: 0 | Status: SHIPPED | OUTPATIENT
Start: 2021-10-27 | End: 2021-12-08 | Stop reason: SDUPTHER

## 2021-10-27 NOTE — PROGRESS NOTES
Kishan Kay   1975  [id number]    HISTORY OF PRESENT ILLNESS:  Mr. Khoa Green is a 42 y.o. male returns for a follow up visit for multiple medical problems. His current presenting problems are   1. [facility] Sprain of neck, initial encounter    2. [facility] Impingement syndrome of shoulder, laterality    . As per information/history obtained from the PADT(patient assessment and documentation tool) - He complains of pain in the neck, upper back, mid back and lower back with radiation to the shoulders Right, arms Right, elbows Right and hands Right He rates the pain 7/10 and describes it as sharp, aching, burning, numbness, pins and needles. Pain is made worse by: movement, walking, standing, sitting, bending, lifting. Current treatment regimen has helped relieve about 70% of the pain. He denies side effects from the current pain regimen. Patient reports that since the last follow up visit the physical functioning is unchanged, family/social relationships are better, mood is better and sleep patterns are unchanged, and that the overall functioning is unchanged. Patient denies neurological bowel or bladder. Patient denies misusing/abusing his narcotic pain medications or using any illegal drugs. There are No indicators for possible drug abuse, addiction or diversion problems. Upon obtaining the medical history from Mr. Khoa Green regarding today's office visit for his presenting problems, patient states he has been doing fair, working full time. He says he as been using Xodol 2 per day. He denies any constipation symptoms. He mentions he has been doing some stretching exercises, walking as tolerated       ALLERGIES: Patients list of allergies were reviewed     MEDICATIONS: Mr. Khoa Green list of medications were reviewed. His current medications are   Outpatient Medications Prior to Visit   Medication Sig Dispense Refill    esomeprazole (NEXIUM) 40 MG capsule Take 40 mg by mouth every morning (before breakfast). No facility-administered medications prior to visit. REVIEW OF SYSTEMS:    Respiratory: Negative for apnea, chest tightness and shortness of breath or change in baseline breathing. PHYSICAL EXAM:   Nursing note and vitals reviewed. /72   Pulse 83   Wt 224 lb (101.6 kg)   BMI 29.55 kg/m²   Constitutional: He appears well-developed and well-nourished. No acute distress. Cardiovascular: Normal rate, regular rhythm, normal heart sounds, and does not have murmur. Pulmonary/Chest: Effort normal. No respiratory distress. He does not have wheezes in the lung fields. He has no rales. Neurological/Psychiatric:He is alert and oriented to person, place, and time. Coordination is  normal.  His mood isAppropriate and affect is Neutral/Euthymic(normal) . His    IMPRESSION:   1. [facility] Sprain of neck, initial encounter    2. [facility] Impingement syndrome of shoulder, laterality    3. [facility] Impingement syndrome of shoulder, unspecified laterality        PLAN:  Informed verbal consent was obtained  -[place] record was obtained and reviewed  for the last one year and no indicators of drug misuse  were found. Any other controlled substance prescriptions  seen on the record have been accounted for, I am aware of the patient receiving these medications. Samanta Little [place] record will be rechecked as part of office protocol. -ROM/Stretching exercises as advised   -Continue with Xodol 1-2 per day  -He was advised to increase fluids ( 5-7  glasses of fluid daily), limit caffeine, avoid cheese products, increase dietary fiber, increase activity and exercise as tolerated and relax regularly and enjoy meals   -Urine drug screen with GC/MS for opiates and drugs of abuse was ordered and will follow up on results. Current Outpatient Medications   Medication Sig Dispense Refill    esomeprazole (NEXIUM) 40 MG capsule Take 40 mg by mouth every morning (before breakfast).        No current facility-administered medications for this visit. I will continue his current medication regimen  which is part of the above treatment schedule. It has been helping with Mr. Shreya Spaulding chronic  medical problems which for this visit include:   Diagnoses of [facility] Sprain of neck, initial encounter and [facility] Impingement syndrome of shoulder, laterality were pertinent to this visit. Risks and benefits of the medications and other alternative treatments  including no treatment were discussed with the patient. The common side effects of these medications were also explained to the patient. Informed verbal consent was obtained. Goals of current treatment regimen include improvement in pain, restoration of functioning- with focus on improvement in physical performance, general activity, work or disability,emotional distress, health care utilization and  decreased medication consumption. Will continue to monitor progress towards achieving/maintaining therapeutic goals with special emphasis on  1. Improvement in perceived interfernce  of pain with ADL's. Ability to do home exercises independently. Ability to do household chores indoor and/or outdoor work and social and leisure activities. Improve psychosocial and physical functioning. - he is showing progression towards this treatment goal with the current regimen. 2. Ability to focus/concentrate at work and perform the duties required of him at work  Sit through a workday without lower extremity symptoms. Stand 30-60 minutes without lower extremity symptoms. Ability to lift up to 10-20 lbs. Ability to go up and down stairs. Sit 30-60 minutes  Without having to stand up frequently. - he is maintaining/progressing towards his work related goals with the current regimen. He was advised against drinking alcohol with the narcotic pain medicines, advised against driving or handling machinery while adjusting the dose of medicines or if having cognitive  issues related to the current medications. Risk of overdose and death, if medicines not taken as prescribed, were also discussed. If the patient develops new symptoms or if the symptoms worsen, the patient should call the office. While transcribing every attempt was made to maintain the accuracy of the note in terms of it's contents,there may have been some errors made inadvertently. Thank you for allowing me to participate in the care of this patient.     Kathy Ricardo MD.    Cc: [phone] [facility]

## 2021-12-08 ENCOUNTER — OFFICE VISIT (OUTPATIENT)
Dept: PAIN MANAGEMENT | Age: 46
End: 2021-12-08
Payer: COMMERCIAL

## 2021-12-08 VITALS
BODY MASS INDEX: 30.16 KG/M2 | OXYGEN SATURATION: 97 % | SYSTOLIC BLOOD PRESSURE: 124 MMHG | DIASTOLIC BLOOD PRESSURE: 82 MMHG | WEIGHT: 228.6 LBS | HEART RATE: 80 BPM

## 2021-12-08 DIAGNOSIS — M75.40 IMPINGEMENT SYNDROME OF SHOULDER, UNSPECIFIED LATERALITY: ICD-10-CM

## 2021-12-08 DIAGNOSIS — S13.9XXA SPRAIN OF NECK, INITIAL ENCOUNTER: ICD-10-CM

## 2021-12-08 PROCEDURE — 99213 OFFICE O/P EST LOW 20 MIN: CPT | Performed by: INTERNAL MEDICINE

## 2021-12-08 RX ORDER — HYDROCODONE BITARTRATE AND ACETAMINOPHEN 5; 300 MG/1; MG/1
1 TABLET ORAL EVERY 6 HOURS PRN
Qty: 90 TABLET | Refills: 0 | Status: SHIPPED | OUTPATIENT
Start: 2021-12-08 | End: 2022-01-28 | Stop reason: SDUPTHER

## 2021-12-08 NOTE — PROGRESS NOTES
Jose R Griffith   1975  [id number]    HISTORY OF PRESENT ILLNESS:  Mr. Rachel Martins is a 42 y.o. male returns for a follow up visit for multiple medical problems. His current presenting problems are   1. [facility] Sprain of neck, initial encounter    2. [facility] Impingement syndrome of shoulder, laterality    . As per information/history obtained from the PADT(patient assessment and documentation tool) - He complains of pain in the upper back, mid back and lower back with radiation to the shoulders Right He rates the pain 7/10 and describes it as aching, numbness. Pain is made worse by: movement, walking, standing, sitting, bending, lifting. Current treatment regimen has helped relieve about 70% of the pain. He denies side effects from the current pain regimen. Patient reports that since the last follow up visit the physical functioning is unchanged, family/social relationships are better, mood is unchanged and sleep patterns are unchanged, and that the overall functioning is better. Patient denies neurological bowel or bladder. Patient denies misusing/abusing his narcotic pain medications or using any illegal drugs. There are No indicators for possible drug abuse, addiction or diversion problems. Upon obtaining the medical history from Mr. Rachel Martins regarding today's office visit for his presenting problems, patient states he has been doing fair, pain has been manageable with the medications. He says he is working full time. He mentions he is using Xodol 1-2 per day, which is helping some. He denies any constipation symptoms. ALLERGIES: Patients list of allergies were reviewed     MEDICATIONS: Mr. Rachel Martins list of medications were reviewed. His current medications are   Outpatient Medications Prior to Visit   Medication Sig Dispense Refill    HYDROcodone-acetaminophen (XODOL) 5-300 MG TABS Take 1 tablet by mouth every 6 hours as needed for Pain (max 2-3 per day) for up to 42 days.  90 tablet 0    esomeprazole (NEXIUM) 40 MG capsule Take 40 mg by mouth every morning (before breakfast). No facility-administered medications prior to visit. REVIEW OF SYSTEMS:    Respiratory: Negative for apnea, chest tightness and shortness of breath or change in baseline breathing. PHYSICAL EXAM:   Nursing note and vitals reviewed. /82   Pulse 80   Wt 228 lb 9.6 oz (103.7 kg)   SpO2 97%   BMI 30.16 kg/m²   Constitutional: He appears well-developed and well-nourished. No acute distress. Cardiovascular: Normal rate, regular rhythm, normal heart sounds, and does not have murmur. Pulmonary/Chest: Effort normal. No respiratory distress. He does not have wheezes in the lung fields. He has no rales. Neurological/Psychiatric:He is alert and oriented to person, place, and time. Coordination is  normal.  His mood isAppropriate and affect is Neutral/Euthymic(normal) . IMPRESSION:   1. [facility] Sprain of neck, initial encounter    2. [facility] Impingement syndrome of shoulder, laterality        PLAN:  Informed verbal consent was obtained  -ROM/Stretching exercises as advised   -Patient's urine drug screen results with GC/MS confirmation were obtained and reviewed and were negative for any illicit drugs. Prescribed medications were within acceptable range.  -Continue with Xodol 1-2 per day   -Walking as tolerated 20-30 minutes daily   -Shoulder exercises given    Current Outpatient Medications   Medication Sig Dispense Refill    HYDROcodone-acetaminophen (XODOL) 5-300 MG TABS Take 1 tablet by mouth every 6 hours as needed for Pain (max 2-3 per day) for up to 42 days. 90 tablet 0    esomeprazole (NEXIUM) 40 MG capsule Take 40 mg by mouth every morning (before breakfast). No current facility-administered medications for this visit. I will continue his current medication regimen  which is part of the above treatment schedule.  It has been helping with Mr. Winnie Cannon chronic  medical problems which for this visit include:   Diagnoses of [facility] Sprain of neck, initial encounter and [facility] Impingement syndrome of shoulder, laterality were pertinent to this visit. Risks and benefits of the medications and other alternative treatments  including no treatment were discussed with the patient. The common side effects of these medications were also explained to the patient. Informed verbal consent was obtained. Goals of current treatment regimen include improvement in pain, restoration of functioning- with focus on improvement in physical performance, general activity, work or disability,emotional distress, health care utilization and  decreased medication consumption. Will continue to monitor progress towards achieving/maintaining therapeutic goals with special emphasis on  1. Improvement in perceived interfernce  of pain with ADL's. Ability to do home exercises independently. Ability to do household chores indoor and/or outdoor work and social and leisure activities. Improve psychosocial and physical functioning. - he is showing progression towards this treatment goal with the current regimen. 2. Ability to focus/concentrate at work and perform the duties required of him at work  Sit through a workday without lower extremity symptoms. Stand 30-60 minutes without lower extremity symptoms. Ability to lift up to 10-20 lbs. Ability to go up and down stairs. Sit 30-60 minutes  Without having to stand up frequently. - he is maintaining/progressing towards his work related goals with the current regimen. He was advised against drinking alcohol with the narcotic pain medicines, advised against driving or handling machinery while adjusting the dose of medicines or if having cognitive  issues related to the current medications. Risk of overdose and death, if medicines not taken as prescribed, were also discussed. If the patient develops new symptoms or if the symptoms worsen, the patient should call the office.     While transcribing every attempt was made to maintain the accuracy of the note in terms of it's contents,there may have been some errors made inadvertently. Thank you for allowing me to participate in the care of this patient.     Ruiz White MD.    Cc: [phone] [facility]

## 2022-01-28 ENCOUNTER — OFFICE VISIT (OUTPATIENT)
Dept: PAIN MANAGEMENT | Age: 47
End: 2022-01-28
Payer: COMMERCIAL

## 2022-01-28 VITALS
WEIGHT: 231 LBS | BODY MASS INDEX: 30.48 KG/M2 | SYSTOLIC BLOOD PRESSURE: 127 MMHG | DIASTOLIC BLOOD PRESSURE: 78 MMHG | HEART RATE: 83 BPM | OXYGEN SATURATION: 96 %

## 2022-01-28 DIAGNOSIS — M75.40 IMPINGEMENT SYNDROME OF SHOULDER, UNSPECIFIED LATERALITY: ICD-10-CM

## 2022-01-28 DIAGNOSIS — S13.9XXA SPRAIN OF NECK, INITIAL ENCOUNTER: ICD-10-CM

## 2022-01-28 PROCEDURE — 99213 OFFICE O/P EST LOW 20 MIN: CPT | Performed by: INTERNAL MEDICINE

## 2022-01-28 RX ORDER — HYDROCODONE BITARTRATE AND ACETAMINOPHEN 5; 300 MG/1; MG/1
1 TABLET ORAL EVERY 6 HOURS PRN
Qty: 90 TABLET | Refills: 0 | Status: SHIPPED | OUTPATIENT
Start: 2022-01-28 | End: 2022-03-09 | Stop reason: SDUPTHER

## 2022-01-28 NOTE — PROGRESS NOTES
Wendie Reynoso II  1975  [id number]    HISTORY OF PRESENT ILLNESS:  Mr. Kera Olguin is a 42 y.o. male returns for a follow up visit for multiple medical problems. His current presenting problems are   1. [facility] Sprain of neck, initial encounter    2. [facility] Impingement syndrome of shoulder, laterality    . As per information/history obtained from the PADT(patient assessment and documentation tool) - He complains of pain in the neck, upper back, mid back and lower back with radiation to the shoulders Right and arms Right He rates the pain 7/10 and describes it as aching, numbness. Pain is made worse by: movement, walking, standing, sitting, bending, lifting. Current treatment regimen has helped relieve about 70% of the pain. He denies side effects from the current pain regimen. Patient reports that since the last follow up visit the physical functioning is better, family/social relationships are unchanged, mood is unchanged and sleep patterns are unchanged, and that the overall functioning is better. Patient denies neurological bowel or bladder. Patient denies misusing/abusing his narcotic pain medications or using any illegal drugs. There are No indicators for possible drug abuse, addiction or diversion problems. Upon obtaining the medical history from Mr. Kera Olguin regarding today's office visit for his presenting problems, patient states he has been doing fair, pain has been manageable. He says he is working full time. He mentions he is using Xodol 2 per day and is managing okay with it. He complains his neck, shoulder hurts the most. He reports he has been driving a lot, working out of town. ALLERGIES: Patients list of allergies were reviewed     MEDICATIONS: Mr. Kera Olguin list of medications were reviewed. His current medications are   Outpatient Medications Prior to Visit   Medication Sig Dispense Refill    esomeprazole (NEXIUM) 40 MG capsule Take 40 mg by mouth every morning (before breakfast).        No facility-administered medications prior to visit. REVIEW OF SYSTEMS:    Respiratory: Negative for apnea, chest tightness and shortness of breath or change in baseline breathing. PHYSICAL EXAM:   Nursing note and vitals reviewed. /78   Pulse 83   Wt 231 lb (104.8 kg)   SpO2 96%   BMI 30.48 kg/m²   Constitutional: He appears well-developed and well-nourished. No acute distress. Cardiovascular: Normal rate, regular rhythm, normal heart sounds, and does not have murmur. Pulmonary/Chest: Effort normal. No respiratory distress. He does not have wheezes in the lung fields. He has no rales. Neurological/Psychiatric:He is alert and oriented to person, place, and time. Coordination is  normal.  His mood isAppropriate and affect is Neutral/Euthymic(normal) . IMPRESSION:   1. [facility] Sprain of neck, initial encounter    2. [facility] Impingement syndrome of shoulder, laterality        PLAN:  Informed verbal consent was obtained  -[place] record was obtained and reviewed  for the last one year and no indicators of drug misuse  were found. Any other controlled substance prescriptions  seen on the record have been accounted for, I am aware of the patient receiving these medications. Portia Christopher [place] record will be rechecked as part of office protocol. -ROM/Stretching exercises as advised   -He was advised to increase fluids ( 5-7  glasses of fluid daily), limit caffeine, avoid cheese products, increase dietary fiber, increase activity and exercise as tolerated and relax regularly and enjoy meals   -Continue Xodol 1-2 per day   -Neck Postural exercises as advised    Current Outpatient Medications   Medication Sig Dispense Refill    esomeprazole (NEXIUM) 40 MG capsule Take 40 mg by mouth every morning (before breakfast). No current facility-administered medications for this visit. I will continue his current medication regimen  which is part of the above treatment schedule.  It has been helping with . [facility]'s chronic  medical problems which for this visit include:   Diagnoses of [facility] Sprain of neck, initial encounter and [facility] Impingement syndrome of shoulder, laterality were pertinent to this visit. Risks and benefits of the medications and other alternative treatments  including no treatment were discussed with the patient. The common side effects of these medications were also explained to the patient. Informed verbal consent was obtained. Goals of current treatment regimen include improvement in pain, restoration of functioning- with focus on improvement in physical performance, general activity, work or disability,emotional distress, health care utilization and  decreased medication consumption. Will continue to monitor progress towards achieving/maintaining therapeutic goals with special emphasis on  1. Improvement in perceived interfernce  of pain with ADL's. Ability to do home exercises independently. Ability to do household chores indoor and/or outdoor work and social and leisure activities. Improve psychosocial and physical functioning. - he is showing progression towards this treatment goal with the current regimen. He was advised against drinking alcohol with the narcotic pain medicines, advised against driving or handling machinery while adjusting the dose of medicines or if having cognitive  issues related to the current medications. Risk of overdose and death, if medicines not taken as prescribed, were also discussed. If the patient develops new symptoms or if the symptoms worsen, the patient should call the office. While transcribing every attempt was made to maintain the accuracy of the note in terms of it's contents,there may have been some errors made inadvertently. Thank you for allowing me to participate in the care of this patient.     Maury Douglas MD.    Cc: [phone] [facility]

## 2022-03-09 ENCOUNTER — OFFICE VISIT (OUTPATIENT)
Dept: PAIN MANAGEMENT | Age: 47
End: 2022-03-09
Payer: COMMERCIAL

## 2022-03-09 VITALS
BODY MASS INDEX: 29.79 KG/M2 | SYSTOLIC BLOOD PRESSURE: 123 MMHG | OXYGEN SATURATION: 95 % | HEART RATE: 88 BPM | WEIGHT: 225.8 LBS | DIASTOLIC BLOOD PRESSURE: 83 MMHG

## 2022-03-09 DIAGNOSIS — M75.40 IMPINGEMENT SYNDROME OF SHOULDER, UNSPECIFIED LATERALITY: ICD-10-CM

## 2022-03-09 DIAGNOSIS — S13.9XXA SPRAIN OF NECK, INITIAL ENCOUNTER: ICD-10-CM

## 2022-03-09 LAB
A/G RATIO: 1.5 (ref 1.1–2.2)
ALBUMIN SERPL-MCNC: 4.4 G/DL (ref 3.4–5)
ALP BLD-CCNC: 84 U/L (ref 40–129)
ALT SERPL-CCNC: 25 U/L (ref 10–40)
ANION GAP SERPL CALCULATED.3IONS-SCNC: 15 MMOL/L (ref 3–16)
AST SERPL-CCNC: 28 U/L (ref 15–37)
BILIRUB SERPL-MCNC: 0.4 MG/DL (ref 0–1)
BUN BLDV-MCNC: 15 MG/DL (ref 7–20)
CALCIUM SERPL-MCNC: 8.8 MG/DL (ref 8.3–10.6)
CHLORIDE BLD-SCNC: 100 MMOL/L (ref 99–110)
CO2: 23 MMOL/L (ref 21–32)
CREAT SERPL-MCNC: 1.1 MG/DL (ref 0.9–1.3)
GFR AFRICAN AMERICAN: >60
GFR NON-AFRICAN AMERICAN: >60
GLUCOSE BLD-MCNC: 188 MG/DL (ref 70–99)
HCT VFR BLD CALC: 43.8 % (ref 40.5–52.5)
HEMOGLOBIN: 15.1 G/DL (ref 13.5–17.5)
MCH RBC QN AUTO: 31.1 PG (ref 26–34)
MCHC RBC AUTO-ENTMCNC: 34.4 G/DL (ref 31–36)
MCV RBC AUTO: 90.2 FL (ref 80–100)
PDW BLD-RTO: 12.6 % (ref 12.4–15.4)
PLATELET # BLD: 309 K/UL (ref 135–450)
PMV BLD AUTO: 7.6 FL (ref 5–10.5)
POTASSIUM SERPL-SCNC: 4.5 MMOL/L (ref 3.5–5.1)
RBC # BLD: 4.86 M/UL (ref 4.2–5.9)
SODIUM BLD-SCNC: 138 MMOL/L (ref 136–145)
TOTAL PROTEIN: 7.3 G/DL (ref 6.4–8.2)
WBC # BLD: 6 K/UL (ref 4–11)

## 2022-03-09 PROCEDURE — 99213 OFFICE O/P EST LOW 20 MIN: CPT | Performed by: INTERNAL MEDICINE

## 2022-03-09 RX ORDER — HYDROCODONE BITARTRATE AND ACETAMINOPHEN 5; 300 MG/1; MG/1
1 TABLET ORAL EVERY 6 HOURS PRN
Qty: 90 TABLET | Refills: 0 | Status: SHIPPED | OUTPATIENT
Start: 2022-03-09 | End: 2022-04-20 | Stop reason: SDUPTHER

## 2022-03-09 NOTE — PROGRESS NOTES
Destinee Haro II  1975  [id number]    HISTORY OF PRESENT ILLNESS:  Mr. Blanche Clifton is a 42 y.o. male returns for a follow up visit for multiple medical problems. His current presenting problems are   1. [facility] Sprain of neck, initial encounter    2. [facility] Impingement syndrome of shoulder, laterality    . As per information/history obtained from the PADT(patient assessment and documentation tool) - He complains of pain in the neck, upper back, mid back and lower back with radiation to the shoulders Right and arms Right He rates the pain 7/10 and describes it as aching, numbness. Pain is made worse by: movement, walking, standing, sitting, bending, lifting. Current treatment regimen has helped relieve about 80% of the pain. He denies side effects from the current pain regimen. Patient reports that since the last follow up visit the physical functioning is unchanged, family/social relationships are unchanged, mood is unchanged and sleep patterns are unchanged, and that the overall functioning is better. Patient denies neurological bowel or bladder. Patient denies misusing/abusing his narcotic pain medications or using any illegal drugs. There are No indicators for possible drug abuse, addiction or diversion problems. Upon obtaining the medical history from Mr. Blanche Clifton regarding today's office visit for his presenting problems, patient states he has been doing fair, working full time. She says she using Xodol 2 per day, still as needed. She complains shoulder and neck and arm hurts. She reports has been driving a lot for work. She denies any constipation symptoms. ALLERGIES: Patients list of allergies were reviewed     MEDICATIONS: Mr. Blanche Clifton list of medications were reviewed. His current medications are   Outpatient Medications Prior to Visit   Medication Sig Dispense Refill    esomeprazole (NEXIUM) 40 MG capsule Take 40 mg by mouth every morning (before breakfast).       HYDROcodone-acetaminophen (Jamal Ralphs) current medication regimen  which is part of the above treatment schedule. It has been helping with Mr. Liz Grover chronic  medical problems which for this visit include:   Diagnoses of [facility] Sprain of neck, initial encounter, [facility] Impingement syndrome of shoulder, laterality, and [facility] Impingement syndrome of shoulder, unspecified laterality were pertinent to this visit. Risks and benefits of the medications and other alternative treatments  including no treatment were discussed with the patient. The common side effects of these medications were also explained to the patient. Informed verbal consent was obtained. Goals of current treatment regimen include improvement in pain, restoration of functioning- with focus on improvement in physical performance, general activity, work or disability,emotional distress, health care utilization and  decreased opioid medication consumption- titrating to the lowest effective dose. Will continue to monitor progress towards achieving/maintaining therapeutic goals with special emphasis on  1. Improvement in perceived interfernce  of pain with ADL's. Ability to do home exercises independently. Ability to do household chores indoor and/or outdoor work and social and leisure activities. Improve psychosocial and physical functioning. - he is showing progression towards this treatment goal with the current regimen. He was advised against drinking alcohol with the narcotic pain medicines, advised against driving or handling machinery while adjusting the dose of medicines or if having cognitive  issues related to the current medications. Risk of overdose and death, if medicines not taken as prescribed, were also discussed. If the patient develops new symptoms or if the symptoms worsen, the patient should call the office. While transcribing every attempt was made to maintain the accuracy of the note in terms of it's contents,there may have been some errors made inadvertently.   Thank you for allowing me to participate in the care of this patient.     Zeeshan Presley MD.    Cc: [phone] [facility],

## 2022-04-20 ENCOUNTER — OFFICE VISIT (OUTPATIENT)
Dept: PAIN MANAGEMENT | Age: 47
End: 2022-04-20
Payer: COMMERCIAL

## 2022-04-20 VITALS
BODY MASS INDEX: 30.74 KG/M2 | DIASTOLIC BLOOD PRESSURE: 63 MMHG | HEART RATE: 86 BPM | SYSTOLIC BLOOD PRESSURE: 95 MMHG | WEIGHT: 233 LBS | OXYGEN SATURATION: 96 %

## 2022-04-20 DIAGNOSIS — S13.9XXA SPRAIN OF NECK, INITIAL ENCOUNTER: ICD-10-CM

## 2022-04-20 DIAGNOSIS — M75.40 IMPINGEMENT SYNDROME OF SHOULDER, UNSPECIFIED LATERALITY: ICD-10-CM

## 2022-04-20 PROCEDURE — 99213 OFFICE O/P EST LOW 20 MIN: CPT | Performed by: INTERNAL MEDICINE

## 2022-04-20 RX ORDER — HYDROCODONE BITARTRATE AND ACETAMINOPHEN 5; 300 MG/1; MG/1
1 TABLET ORAL EVERY 6 HOURS PRN
Qty: 90 TABLET | Refills: 0 | Status: SHIPPED | OUTPATIENT
Start: 2022-04-20 | End: 2022-06-01 | Stop reason: SDUPTHER

## 2022-04-20 NOTE — PROGRESS NOTES
Giovanni Prieto   1975  [id number]      HISTORY OF PRESENT ILLNESS:  Mr. Hood Peters is a 42 y.o. male returns for a follow up visit for pain management  He has a diagnosis of   1. BWC Sprain of neck, initial encounter    2. bwc Impingement syndrome of shoulder, laterality    . He complains of pain in the neck, right shoulder, upper back, mid back, lower back with radiation to the right arm, right elbow He rates the pain 7/10 and describes it as aching, numbness. Current treatment regimen has helped relieve about 70% of the pain. He denies any side effects from the current pain regimen. Patient reports that since the last follow up visit the physical functioning is unchanged, family/social relationships are unchanged, mood is better sleep patterns are unchanged, and that the overall functioning is better. Patient denies misusing/abusing his narcotic pain medications or using any illegal drugs. There are No indicators for possible drug abuse, addiction or diversion problems.  reports he has been working full time. He says she has been managing with the medications. He mentions she is using Xodol still 1-2 per day. He denies any constipation symptoms. She reports her neck, shoulder and back hurts the most.     ALLERGIES: Patients list of allergies were reviewed     MEDICATIONS: Mr. Hood Peters list of medications were reviewed. His current medications are   Outpatient Medications Prior to Visit   Medication Sig Dispense Refill    HYDROcodone-acetaminophen (XODOL) 5-300 MG TABS Take 1 tablet by mouth every 6 hours as needed for Pain (max 2-3 per day) for up to 42 days. 90 tablet 0    esomeprazole (NEXIUM) 40 MG capsule Take 40 mg by mouth every morning (before breakfast). No facility-administered medications prior to visit. REVIEW OF SYSTEMS:    Respiratory: Negative for apnea, chest tightness and shortness of breath or change in baseline breathing.       PHYSICAL EXAM:   Nursing note and vitals reviewed. BP 95/63   Pulse 86   Wt 233 lb (105.7 kg)   SpO2 96%   BMI 30.74 kg/m²   Constitutional: He appears well-developed and well-nourished. No acute distress. Cardiovascular: Normal rate, regular rhythm, normal heart sounds, and does not have murmur. Pulmonary/Chest: Effort normal. No respiratory distress. He does not have wheezes in the lung fields. He has no rales. Neurological/Psychiatric:He is alert and oriented to person, place, and time. Coordination is  normal.  His mood isAppropriate and affect is Neutral/Euthymic(normal) . IMPRESSION:   1. BWC Sprain of neck, initial encounter    2. bwc Impingement syndrome of shoulder, laterality        PLAN:  Informed verbal consent was obtained  -Neck postural exercises as advised   -He was advised to increase fluids ( 5-7  glasses of fluid daily), limit caffeine, avoid cheese products, increase dietary fiber, increase activity and exercise as tolerated and relax regularly and enjoy meals   -Walking as tolerated 20 minutes daily   -Continue with Xodol 1-2 per day    Current Outpatient Medications   Medication Sig Dispense Refill    HYDROcodone-acetaminophen (XODOL) 5-300 MG TABS Take 1 tablet by mouth every 6 hours as needed for Pain (max 2-3 per day) for up to 42 days. 90 tablet 0    esomeprazole (NEXIUM) 40 MG capsule Take 40 mg by mouth every morning (before breakfast). No current facility-administered medications for this visit. I will continue his current medication regimen  which is part of the above treatment schedule. It has been helping with Mr. Altaf Alexandra chronic  medical problems which for this visit include:   Diagnoses of BWC Sprain of neck, initial encounter and bwc Impingement syndrome of shoulder, laterality were pertinent to this visit. Risks and benefits of the medications and other alternative treatments  including no treatment were discussed with the patient. The common side effects of these medications were also explained to the patient. Informed verbal consent was obtained. Goals of current treatment regimen include improvement in pain, restoration of functioning- with focus on improvement in physical performance, general activity, work or disability,emotional distress, health care utilization and  decreased medication consumption. Will continue to monitor progress towards achieving/maintaining therapeutic goals with special emphasis on  1. Improvement in perceived interfernce  of pain with ADL's. Ability to do home exercises independently. Ability to do household chores indoor and/or outdoor work and social and leisure activities. Improve psychosocial and physical functioning. - he is showing progression towards this treatment goal with the current regimen. He was advised against drinking alcohol with the narcotic pain medicines, advised against driving or handling machinery while adjusting the dose of medicines or if having cognitive  issues related to the current medications. Risk of overdose and death, if medicines not taken as prescribed, were also discussed. If the patient develops new symptoms or if the symptoms worsen, the patient should call the office. While transcribing every attempt was made to maintain the accuracy of the note in terms of it's contents,there may have been some errors made inadvertently. Thank you for allowing me to participate in the care of this patient.     Juanito Rey MD.    Cc: [id number]

## 2022-06-01 ENCOUNTER — OFFICE VISIT (OUTPATIENT)
Dept: PAIN MANAGEMENT | Age: 47
End: 2022-06-01
Payer: COMMERCIAL

## 2022-06-01 VITALS
BODY MASS INDEX: 30.71 KG/M2 | TEMPERATURE: 97.1 F | DIASTOLIC BLOOD PRESSURE: 71 MMHG | SYSTOLIC BLOOD PRESSURE: 107 MMHG | WEIGHT: 232.8 LBS | HEART RATE: 73 BPM

## 2022-06-01 DIAGNOSIS — M75.40 IMPINGEMENT SYNDROME OF SHOULDER, UNSPECIFIED LATERALITY: ICD-10-CM

## 2022-06-01 DIAGNOSIS — S13.9XXA SPRAIN OF NECK, INITIAL ENCOUNTER: ICD-10-CM

## 2022-06-01 PROCEDURE — 99213 OFFICE O/P EST LOW 20 MIN: CPT | Performed by: INTERNAL MEDICINE

## 2022-06-01 RX ORDER — HYDROCODONE BITARTRATE AND ACETAMINOPHEN 5; 300 MG/1; MG/1
1 TABLET ORAL EVERY 6 HOURS PRN
Qty: 90 TABLET | Refills: 0 | Status: SHIPPED | OUTPATIENT
Start: 2022-06-01 | End: 2022-07-13 | Stop reason: SDUPTHER

## 2022-06-01 NOTE — PROGRESS NOTES
Rebecca Kelsey   1975  [id number]      HISTORY OF PRESENT ILLNESS:  Mr. Dejuan Marcos is a 42 y.o. male returns for a follow up visit for pain management  He has a diagnosis of   1. BWC Sprain of neck, initial encounter    2. bwc Impingement syndrome of shoulder, laterality    3. bwc Impingement syndrome of shoulder, unspecified laterality    . He complains of pain in the Neck, upper and lower back, right shoulder  He rates the pain 7/10 and describes it as sharp, aching, burning, numbness, pins and needles. Current treatment regimen has helped relieve about 70% of the pain. He denies any side effects from the current pain regimen. Patient reports that since the last follow up visit the physical functioning is better, family/social relationships are unchanged, mood is unchanged sleep patterns are unchanged, and that the overall functioning is better. Patient denies misusing/abusing his narcotic pain medications or using any illegal drugs. There are No indicators for possible drug abuse, addiction or diversion problems. Patient states he has been doing fair, working full time still. He report he has been managing his ADLs. He denies any side effects. He says he is using Xodol 1-2 per day along with Ibuprofen as needed. He mentions he is using Nexium as needed. He denies any constipation symptoms. ALLERGIES: Patients list of allergies were reviewed     MEDICATIONS: Mr. Dejuan Marcos list of medications were reviewed. His current medications are   Outpatient Medications Prior to Visit   Medication Sig Dispense Refill    HYDROcodone-acetaminophen (XODOL) 5-300 MG TABS Take 1 tablet by mouth every 6 hours as needed for Pain (max 2-3 per day) for up to 42 days. 90 tablet 0    esomeprazole (NEXIUM) 40 MG capsule Take 40 mg by mouth every morning (before breakfast). No facility-administered medications prior to visit.         REVIEW OF SYSTEMS:    Respiratory: Negative for apnea, chest tightness and shortness of breath or change in baseline breathing. PHYSICAL EXAM:   Nursing note and vitals reviewed. /71   Pulse 73   Temp 97.1 °F (36.2 °C)   Wt 232 lb 12.8 oz (105.6 kg)   BMI 30.71 kg/m²   Constitutional: He appears well-developed and well-nourished. No acute distress. Cardiovascular: Normal rate, regular rhythm, normal heart sounds, and does not have murmur. Pulmonary/Chest: Effort normal. No respiratory distress. He does not have wheezes in the lung fields. He has no rales. Neurological/Psychiatric:He is alert and oriented to person, place, and time. Coordination is  normal.  His mood isAppropriate and affect is Neutral/Euthymic(normal) . IMPRESSION:   1. BWC Sprain of neck, initial encounter    2. bwc Impingement syndrome of shoulder, laterality    3. bwc Impingement syndrome of shoulder, unspecified laterality        PLAN:  Informed verbal consent was obtained  -[place] was obtained and reviewed  for the last one year and no indicators of drug misuse  were found. Any other controlled substance prescriptions  seen on the record have been accounted for, I am aware of the patient receiving these medications. David Walters [place] record will be rechecked as part of office protocol. -ROM/Stretching exercises of shoulder and neck   -He was advised to increase fluids ( 5-7  glasses of fluid daily), limit caffeine, avoid cheese products, increase dietary fiber, increase activity and exercise as tolerated and relax regularly and enjoy meals   -Continue with Xodol 1-2 per day   -He was advised weight reduction, diet changes- 800-1200 pop diet, diet diary, exercising, nutritional  consult increased physical activity as tolerated    Current Outpatient Medications   Medication Sig Dispense Refill    HYDROcodone-acetaminophen (XODOL) 5-300 MG TABS Take 1 tablet by mouth every 6 hours as needed for Pain (max 2-3 per day) for up to 42 days.  90 tablet 0    esomeprazole (NEXIUM) 40 MG capsule Take 40 mg by mouth every morning (before breakfast). No current facility-administered medications for this visit. I will continue his current medication regimen  which is part of the above treatment schedule. It has been helping with Mr. Nat Lazo chronic  medical problems which for this visit include:   Diagnoses of BWC Sprain of neck, initial encounter, bwc Impingement syndrome of shoulder, laterality, and bwc Impingement syndrome of shoulder, unspecified laterality were pertinent to this visit. Risks and benefits of the medications and other alternative treatments  including no treatment were discussed with the patient. The common side effects of these medications were also explained to the patient. Informed verbal consent was obtained. Goals of current treatment regimen include improvement in pain, restoration of functioning- with focus on improvement in physical performance, general activity, work or disability,emotional distress, health care utilization and  decreased medication consumption. Will continue to monitor progress towards achieving/maintaining therapeutic goals with special emphasis on  1. Improvement in perceived interfernce  of pain with ADL's. Ability to do home exercises independently. Ability to do household chores indoor and/or outdoor work and social and leisure activities. Improve psychosocial and physical functioning. - he is showing progression towards this treatment goal with the current regimen. He was advised against drinking alcohol with the narcotic pain medicines, advised against driving or handling machinery while adjusting the dose of medicines or if having cognitive  issues related to the current medications. Risk of overdose and death, if medicines not taken as prescribed, were also discussed. If the patient develops new symptoms or if the symptoms worsen, the patient should call the office.     While transcribing every attempt was made to maintain the accuracy of the note in terms of it's contents,there may have been some errors made inadvertently. Thank you for allowing me to participate in the care of this patient.     Tyrell Madden MD.    Cc: [id number]

## 2022-07-13 ENCOUNTER — OFFICE VISIT (OUTPATIENT)
Dept: PAIN MANAGEMENT | Age: 47
End: 2022-07-13
Payer: COMMERCIAL

## 2022-07-13 VITALS
HEART RATE: 76 BPM | TEMPERATURE: 97.3 F | HEIGHT: 73 IN | SYSTOLIC BLOOD PRESSURE: 118 MMHG | WEIGHT: 232.6 LBS | BODY MASS INDEX: 30.83 KG/M2 | DIASTOLIC BLOOD PRESSURE: 70 MMHG

## 2022-07-13 DIAGNOSIS — M75.40 IMPINGEMENT SYNDROME OF SHOULDER, UNSPECIFIED LATERALITY: ICD-10-CM

## 2022-07-13 DIAGNOSIS — S13.9XXA SPRAIN OF NECK, INITIAL ENCOUNTER: ICD-10-CM

## 2022-07-13 PROCEDURE — 99213 OFFICE O/P EST LOW 20 MIN: CPT | Performed by: INTERNAL MEDICINE

## 2022-07-13 RX ORDER — HYDROCODONE BITARTRATE AND ACETAMINOPHEN 5; 300 MG/1; MG/1
1 TABLET ORAL EVERY 6 HOURS PRN
Qty: 90 TABLET | Refills: 0 | Status: SHIPPED | OUTPATIENT
Start: 2022-07-13 | End: 2022-08-24 | Stop reason: SDUPTHER

## 2022-07-13 NOTE — PROGRESS NOTES
Yanelis Cruz II  1975  [id number]      HISTORY OF PRESENT ILLNESS:  Mr. Jose Miguel Marroquin is a 41 y.o. male returns for a follow up visit for pain management  He has a diagnosis of   1. BWC Sprain of neck, initial encounter    2. bwc Impingement syndrome of shoulder, laterality    3. bwc Impingement syndrome of shoulder, unspecified laterality    . He complains of pain in the Neck, upper, mid and low back, with radiation to right arm  He rates the pain 7/10 and describes it as sharp, aching, burning, numbness, pins and needles. Current treatment regimen has helped relieve about 70% of the pain. He denies any side effects from the current pain regimen. Patient reports that since the last follow up visit the physical functioning is unchanged, family/social relationships are better, mood is unchanged sleep patterns are unchanged, and that the overall functioning is better. Patient denies misusing/abusing his narcotic pain medications or using any illegal drugs. There are No indicators for possible drug abuse, addiction or diversion problems. Patient states he has been doing fair, pain has been slightly worse. He mentions he is working 7 days per week. He says he is using Norco 1-2 per day along with OTC NSAIDs. He denies any constipation or GERD symptoms. ALLERGIES: Patients list of allergies were reviewed     MEDICATIONS: Mr. Jose Miguel Marroquin list of medications were reviewed. His current medications are   Outpatient Medications Prior to Visit   Medication Sig Dispense Refill    esomeprazole (NEXIUM) 40 MG capsule Take 40 mg by mouth every morning (before breakfast).  HYDROcodone-acetaminophen (XODOL) 5-300 MG TABS Take 1 tablet by mouth every 6 hours as needed for Pain (max 2-3 per day) for up to 42 days. 90 tablet 0     No facility-administered medications prior to visit. REVIEW OF SYSTEMS:    Respiratory: Negative for apnea, chest tightness and shortness of breath or change in baseline breathing. PHYSICAL EXAM:   Nursing note and vitals reviewed. /70 (Site: Left Upper Arm, Position: Sitting, Cuff Size: Medium Adult)   Pulse 76   Temp 97.3 °F (36.3 °C) (Temporal)   Ht 6' 1\" (1.854 m)   Wt 232 lb 9.6 oz (105.5 kg)   BMI 30.69 kg/m²   Constitutional: He appears well-developed and well-nourished. No acute distress. Cardiovascular: Normal rate, regular rhythm, normal heart sounds, and does not have murmur. Pulmonary/Chest: Effort normal. No respiratory distress. He does not have wheezes in the lung fields. He has no rales. Neurological/Psychiatric:He is alert and oriented to person, place, and time. Coordination is  normal.  His mood isAppropriate and affect is Neutral/Euthymic(normal) . IMPRESSION:   1. BWC Sprain of neck, initial encounter    2. bwc Impingement syndrome of shoulder, laterality    3. bwc Impingement syndrome of shoulder, unspecified laterality        PLAN:  Informed verbal consent was obtained  -ROM/Stretching exercises as advised   -He was advised to increase fluids ( 5-7  glasses of fluid daily), limit caffeine, avoid cheese products, increase dietary fiber, increase activity and exercise as tolerated and relax regularly and enjoy meals   -Continue with Xodol 1-2 per day   -Neck postural exercises as advised    Current Outpatient Medications   Medication Sig Dispense Refill    HYDROcodone-acetaminophen (XODOL) 5-300 MG TABS Take 1 tablet by mouth every 6 hours as needed for Pain (max 2-3 per day) for up to 42 days. 90 tablet 0    esomeprazole (NEXIUM) 40 MG capsule Take 40 mg by mouth every morning (before breakfast). No current facility-administered medications for this visit. I will continue his current medication regimen  which is part of the above treatment schedule.  It has been helping with Mr. Mariella Perera chronic  medical problems which for this visit include:   Diagnoses of BWC Sprain of neck, initial encounter, bwc Impingement syndrome of shoulder, laterality, and [facility] Impingement syndrome of shoulder, unspecified laterality were pertinent to this visit. Risks and benefits of the medications and other alternative treatments  including no treatment were discussed with the patient. The common side effects of these medications were also explained to the patient. Informed verbal consent was obtained. Goals of current treatment regimen include improvement in pain, restoration of functioning- with focus on improvement in physical performance, general activity, work or disability,emotional distress, health care utilization and  decreased medication consumption. Will continue to monitor progress towards achieving/maintaining therapeutic goals with special emphasis on  1. Improvement in perceived interfernce  of pain with ADL's. Ability to do home exercises independently. Ability to do household chores indoor and/or outdoor work and social and leisure activities. Improve psychosocial and physical functioning. - he is showing progression towards this treatment goal with the current regimen. He was advised against drinking alcohol with the narcotic pain medicines, advised against driving or handling machinery while adjusting the dose of medicines or if having cognitive  issues related to the current medications. Risk of overdose and death, if medicines not taken as prescribed, were also discussed. If the patient develops new symptoms or if the symptoms worsen, the patient should call the office. While transcribing every attempt was made to maintain the accuracy of the note in terms of it's contents,there may have been some errors made inadvertently. Thank you for allowing me to participate in the care of this patient.     Martha Barrett MD.    Cc: [id number]

## 2022-08-24 ENCOUNTER — OFFICE VISIT (OUTPATIENT)
Dept: PAIN MANAGEMENT | Age: 47
End: 2022-08-24
Payer: COMMERCIAL

## 2022-08-24 VITALS
OXYGEN SATURATION: 96 % | DIASTOLIC BLOOD PRESSURE: 67 MMHG | SYSTOLIC BLOOD PRESSURE: 102 MMHG | HEART RATE: 72 BPM | BODY MASS INDEX: 30.71 KG/M2 | WEIGHT: 232.8 LBS

## 2022-08-24 DIAGNOSIS — M75.40 IMPINGEMENT SYNDROME OF SHOULDER, UNSPECIFIED LATERALITY: ICD-10-CM

## 2022-08-24 DIAGNOSIS — S13.9XXA SPRAIN OF NECK, INITIAL ENCOUNTER: ICD-10-CM

## 2022-08-24 PROCEDURE — 99213 OFFICE O/P EST LOW 20 MIN: CPT | Performed by: INTERNAL MEDICINE

## 2022-08-24 RX ORDER — HYDROCODONE BITARTRATE AND ACETAMINOPHEN 5; 300 MG/1; MG/1
1 TABLET ORAL EVERY 6 HOURS PRN
Qty: 90 TABLET | Refills: 0 | Status: SHIPPED | OUTPATIENT
Start: 2022-08-24 | End: 2022-10-05 | Stop reason: SDUPTHER

## 2022-08-26 NOTE — PROGRESS NOTES
Corry Ying   1975  [id number]      HISTORY OF PRESENT ILLNESS:  Mr. Ascencion Perez is a 41 y.o. male returns for a follow up visit for pain management  He has a diagnosis of   1. BWC Sprain of neck, initial encounter    2. bwc Impingement syndrome of shoulder, laterality    3. bwc Impingement syndrome of shoulder, unspecified laterality    . He complains of pain in the  head, neck, right elbow, upper back, mid back, lower back with radiation to the right shoulder, right arm  He rates the pain 7/10 and describes it as aching, numbness. Current treatment regimen has helped relieve about 30% of the pain. He denies any side effects from the current pain regimen. Patient reports that since the last follow up visit the physical functioning is unchanged, family/social relationships are better, mood is unchanged sleep patterns are unchanged, and that the overall functioning is better. Patient denies misusing/abusing his narcotic pain medications or using any illegal drugs. There are No indicators for possible drug abuse, addiction or diversion problems, patient states he has been doing fair. Mr. 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 Patient states his neck and shoulder hurts along with his mid back. He mentions he is using Xodol 1-2 per day. Patient denies any constipation symptoms. ALLERGIES: Patients list of allergies were reviewed     MEDICATIONS: Mr. Ascencion Perez list of medications were reviewed. His current medications are   Outpatient Medications Prior to Visit   Medication Sig Dispense Refill    esomeprazole (NEXIUM) 40 MG delayed release capsule Take 40 mg by mouth every morning (before breakfast). HYDROcodone-acetaminophen (XODOL) 5-300 MG TABS Take 1 tablet by mouth every 6 hours as needed for Pain (max 2-3 per day) for up to 42 days. 90 tablet 0     No facility-administered medications prior to visit.         REVIEW OF SYSTEMS:    Respiratory: Negative for apnea, chest tightness and shortness of breath or change in baseline breathing. PHYSICAL EXAM:   Nursing note and vitals reviewed. /67   Pulse 72   Wt 232 lb 12.8 oz (105.6 kg)   SpO2 96%   BMI 30.71 kg/m²   Constitutional: He appears well-developed and well-nourished. No acute distress. Cardiovascular: Normal rate, regular rhythm, normal heart sounds, and does not have murmur. Pulmonary/Chest: Effort normal. No respiratory distress. He does not have wheezes in the lung fields. He has no rales. Neurological/Psychiatric:He is alert and oriented to person, place, and time. Coordination is  normal.  His mood isAppropriate and affect is Neutral/Euthymic(normal) . His    IMPRESSION:   1. BWC Sprain of neck, initial encounter    2. BWC Impingement syndrome of shoulder, laterality    3. BWC Impingement syndrome of shoulder, unspecified laterality        PLAN:  Informed verbal consent was obtained  -ROM/Stretching exercises as advised   -Continue with Xodol 1-2 per day  -Neck postural exercises as advised   Current Outpatient Medications   Medication Sig Dispense Refill    HYDROcodone-acetaminophen (XODOL) 5-300 MG TABS Take 1 tablet by mouth every 6 hours as needed for Pain (max 2-3 per day) for up to 42 days. 90 tablet 0    esomeprazole (NEXIUM) 40 MG delayed release capsule Take 40 mg by mouth every morning (before breakfast). No current facility-administered medications for this visit. I will continue his current medication regimen  which is part of the above treatment schedule. It has been helping with Mr. Opal Das chronic  medical problems which for this visit include:   Diagnoses of BWC Sprain of neck, initial encounter, bwc Impingement syndrome of shoulder, laterality, and bwc Impingement syndrome of shoulder, unspecified laterality were pertinent to this visit. Risks and benefits of the medications and other alternative treatments  including no treatment were discussed with the patient. The common side effects of these medications were also explained to the patient. Informed verbal consent was obtained. Goals of current treatment regimen include improvement in pain, restoration of functioning- with focus on improvement in physical performance, general activity, work or disability,emotional distress, health care utilization and  decreased medication consumption. Will continue to monitor progress towards achieving/maintaining therapeutic goals with special emphasis on  1. Improvement in perceived interfernce  of pain with ADL's. Ability to do home exercises independently. Ability to do household chores indoor and/or outdoor work and social and leisure activities. Improve psychosocial and physical functioning. - he is showing progression towards this treatment goal with the current regimen. He was advised against drinking alcohol with the narcotic pain medicines, advised against driving or handling machinery while adjusting the dose of medicines or if having cognitive  issues related to the current medications. Risk of overdose and death, if medicines not taken as prescribed, were also discussed. If the patient develops new symptoms or if the symptoms worsen, the patient should call the office. While transcribing every attempt was made to maintain the accuracy of the note in terms of it's contents,there may have been some errors made inadvertently. Thank you for allowing me to participate in the care of this patient.     Emy Grimaldo MD.    Cc: [id number] [facility]

## 2022-10-05 ENCOUNTER — OFFICE VISIT (OUTPATIENT)
Dept: PAIN MANAGEMENT | Age: 47
End: 2022-10-05
Payer: COMMERCIAL

## 2022-10-05 VITALS
BODY MASS INDEX: 30.48 KG/M2 | WEIGHT: 231 LBS | DIASTOLIC BLOOD PRESSURE: 83 MMHG | SYSTOLIC BLOOD PRESSURE: 119 MMHG | HEART RATE: 80 BPM

## 2022-10-05 DIAGNOSIS — M75.40 IMPINGEMENT SYNDROME OF SHOULDER, UNSPECIFIED LATERALITY: ICD-10-CM

## 2022-10-05 DIAGNOSIS — S13.9XXA SPRAIN OF NECK, INITIAL ENCOUNTER: ICD-10-CM

## 2022-10-05 PROCEDURE — 99213 OFFICE O/P EST LOW 20 MIN: CPT | Performed by: INTERNAL MEDICINE

## 2022-10-05 RX ORDER — HYDROCODONE BITARTRATE AND ACETAMINOPHEN 5; 300 MG/1; MG/1
1 TABLET ORAL EVERY 6 HOURS PRN
Qty: 90 TABLET | Refills: 0 | Status: SHIPPED | OUTPATIENT
Start: 2022-10-05 | End: 2022-11-16

## 2022-10-06 NOTE — PROGRESS NOTES
Chloe Miltonon   1975  [id number]      HISTORY OF PRESENT ILLNESS:  Mr. Nayeli Resendez is a 41 y.o. male returns for a follow up visit for pain management  He has a diagnosis of   1. BWC Sprain of neck, initial encounter    2. BWC Impingement syndrome of shoulder, laterality    3. BWC Impingement syndrome of shoulder, unspecified laterality    . He complains of pain in the  neck, upper back, mid back, lower back, right elbow with radiation to the right shoulder, right arm  He rates the pain 7/10 and describes it as aching, numbness. Current treatment regimen has helped relieve about 70% of the pain. He denies any side effects from the current pain regimen. Patient reports that since the last follow up visit the physical functioning is better, family/social relationships are unchanged, mood is unchanged sleep patterns are unchanged, and that the overall functioning is better. Patient denies misusing/abusing his narcotic pain medications or using any illegal drugs. There are No indicators for possible drug abuse, addiction or diversion problems, patient states he has been doing fair, his pain has been baseline. Patient reports she is working 40 hours a week. She mentions she is using Norco 1-2 per day. Patient denies any constipation symptoms. She says she is using Ibuprofen. Patient is complaining of her shoulder, neck, arm and back hurting. ALLERGIES: Patients list of allergies were reviewed     MEDICATIONS: Mr. Nayeli Resendez list of medications were reviewed. His current medications are   Outpatient Medications Prior to Visit   Medication Sig Dispense Refill    esomeprazole (NEXIUM) 40 MG delayed release capsule Take 40 mg by mouth every morning (before breakfast). HYDROcodone-acetaminophen (XODOL) 5-300 MG TABS Take 1 tablet by mouth every 6 hours as needed for Pain (max 2-3 per day) for up to 42 days. 90 tablet 0     No facility-administered medications prior to visit.         REVIEW OF SYSTEMS: Respiratory: Negative for apnea, chest tightness and shortness of breath or change in baseline breathing. PHYSICAL EXAM:   Nursing note and vitals reviewed. /83   Pulse 80   Wt 231 lb (104.8 kg)   BMI 30.48 kg/m²   Constitutional: He appears well-developed and well-nourished. No acute distress. Cardiovascular: Normal rate, regular rhythm, normal heart sounds, and does not have murmur. Pulmonary/Chest: Effort normal. No respiratory distress. He does not have wheezes in the lung fields. He has no rales. Neurological/Psychiatric:He is alert and oriented to person, place, and time. Coordination is  normal.  His mood isAppropriate and affect is Neutral/Euthymic(normal) . His    IMPRESSION:   1. BWC Sprain of neck, initial encounter    2. BWC Impingement syndrome of shoulder, laterality    3. BWC Impingement syndrome of shoulder, unspecified laterality        PLAN:  Informed verbal consent was obtained  -[place] was obtained and reviewed  for the last one year and no indicators of drug misuse  were found. Any other controlled substance prescriptions  seen on the record have been accounted for, I am aware of the patient receiving these medications. Mathew Yang [place] record will be rechecked as part of office protocol. -ROM/Stretching exercises as advised   -She was advised to increase fluids ( 5-7  glasses of fluid daily), limit caffeine, avoid cheese products, increase dietary fiber, increase activity and exercise as tolerated and relax regularly and enjoy meals   -Continue with Norco 1-2 per day  -Walking as tolerated    -Urine drug screen with GC/MS for opiates and drugs of abuse was ordered and will follow up on results   Current Outpatient Medications   Medication Sig Dispense Refill    HYDROcodone-acetaminophen (XODOL) 5-300 MG TABS Take 1 tablet by mouth every 6 hours as needed for Pain (max 2-3 per day) for up to 42 days.  90 tablet 0    esomeprazole (NEXIUM) 40 MG delayed release capsule Take 40 mg by mouth every morning (before breakfast). No current facility-administered medications for this visit. I will continue his current medication regimen  which is part of the above treatment schedule. It has been helping with Mr. Maribell Vasquez chronic  medical problems which for this visit include:   Diagnoses of BWC Sprain of neck, initial encounter, BWC Impingement syndrome of shoulder, laterality, and BWC Impingement syndrome of shoulder, unspecified laterality were pertinent to this visit. Risks and benefits of the medications and other alternative treatments  including no treatment were discussed with the patient. The common side effects of these medications were also explained to the patient. Informed verbal consent was obtained. Goals of current treatment regimen include improvement in pain, restoration of functioning- with focus on improvement in physical performance, general activity, work or disability,emotional distress, health care utilization and  decreased medication consumption. Will continue to monitor progress towards achieving/maintaining therapeutic goals with special emphasis on  1. Improvement in perceived interfernce  of pain with ADL's. Ability to do home exercises independently. Ability to do household chores indoor and/or outdoor work and social and leisure activities. Improve psychosocial and physical functioning. - he is showing progression towards this treatment goal with the current regimen. He was advised against drinking alcohol with the narcotic pain medicines, advised against driving or handling machinery while adjusting the dose of medicines or if having cognitive  issues related to the current medications. Risk of overdose and death, if medicines not taken as prescribed, were also discussed. If the patient develops new symptoms or if the symptoms worsen, the patient should call the office.     While transcribing every attempt was made to maintain the accuracy of the note in terms of it's contents,there may have been some errors made inadvertently. Thank you for allowing me to participate in the care of this patient.     Janyth Boeck, MD.    Cc: [address] [facility]

## 2022-11-23 ENCOUNTER — OFFICE VISIT (OUTPATIENT)
Dept: PAIN MANAGEMENT | Age: 47
End: 2022-11-23
Payer: COMMERCIAL

## 2022-11-23 VITALS
OXYGEN SATURATION: 97 % | HEART RATE: 81 BPM | BODY MASS INDEX: 30.05 KG/M2 | DIASTOLIC BLOOD PRESSURE: 80 MMHG | SYSTOLIC BLOOD PRESSURE: 119 MMHG | WEIGHT: 227.8 LBS

## 2022-11-23 DIAGNOSIS — M75.40 IMPINGEMENT SYNDROME OF SHOULDER, UNSPECIFIED LATERALITY: ICD-10-CM

## 2022-11-23 DIAGNOSIS — S13.9XXA SPRAIN OF NECK, INITIAL ENCOUNTER: ICD-10-CM

## 2022-11-23 PROCEDURE — 99213 OFFICE O/P EST LOW 20 MIN: CPT | Performed by: INTERNAL MEDICINE

## 2022-11-23 RX ORDER — HYDROCODONE BITARTRATE AND ACETAMINOPHEN 5; 300 MG/1; MG/1
1 TABLET ORAL EVERY 6 HOURS PRN
Qty: 90 TABLET | Refills: 0 | Status: SHIPPED | OUTPATIENT
Start: 2022-11-23 | End: 2023-01-04

## 2022-11-23 NOTE — PROGRESS NOTES
Mickinemo Johns   1975  [id number]      HISTORY OF PRESENT ILLNESS:  Mr. Zaira Peraza is a 41 y.o. male returns for a follow up visit for pain management  He has a diagnosis of   1. BWC Impingement syndrome of shoulder, laterality    2. BWC Sprain of neck, initial encounter    3. BWC Impingement syndrome of shoulder, unspecified laterality    . He complains of pain in the  neck, upper back, mid back, lower back, right shoulder with radiation to the right arm  He rates the pain 7/10 and describes it as aching, numbness. Current treatment regimen has helped relieve about 70% of the pain. He denies any side effects from the current pain regimen. Patient reports that since the last follow up visit the physical functioning is unchanged, family/social relationships are unchanged, mood is unchanged sleep patterns are unchanged, and that the overall functioning is unchanged. Patient denies misusing/abusing his narcotic pain medications or using any illegal drugs. There are No indicators for possible drug abuse, addiction or diversion problems.  states he has been doing fair, working full time. He says he is using Xodol 1-2 per day. He complains his neck and shoulders hurt the most. He denies any constipation symptoms. ALLERGIES: Patients list of allergies were reviewed     MEDICATIONS: Mr. Zaira Peraza list of medications were reviewed. His current medications are   Outpatient Medications Prior to Visit   Medication Sig Dispense Refill    HYDROcodone-acetaminophen (XODOL) 5-300 MG TABS Take 1 tablet by mouth every 6 hours as needed for Pain (max 2-3 per day) for up to 42 days. 90 tablet 0    esomeprazole (NEXIUM) 40 MG delayed release capsule Take 40 mg by mouth every morning (before breakfast). No facility-administered medications prior to visit. REVIEW OF SYSTEMS:    Respiratory: Negative for apnea, chest tightness and shortness of breath or change in baseline breathing.       PHYSICAL EXAM: current facility-administered medications for this visit. I will continue his current medication regimen  which is part of the above treatment schedule. It has been helping with Mr. Farrukh Hart chronic  medical problems which for this visit include:   Diagnoses of BWC Impingement syndrome of shoulder, laterality, BWC Sprain of neck, initial encounter, and BWC Impingement syndrome of shoulder, unspecified laterality were pertinent to this visit. Risks and benefits of the medications and other alternative treatments  including no treatment were discussed with the patient. The common side effects of these medications were also explained to the patient. Informed verbal consent was obtained. Goals of current treatment regimen include improvement in pain, restoration of functioning- with focus on improvement in physical performance, general activity, work or disability,emotional distress, health care utilization and  decreased medication consumption. Will continue to monitor progress towards achieving/maintaining therapeutic goals with special emphasis on  1. Improvement in perceived interfernce  of pain with ADL's. Ability to do home exercises independently. Ability to do household chores indoor and/or outdoor work and social and leisure activities. Improve psychosocial and physical functioning. - he is showing progression towards this treatment goal with the current regimen. He was advised against drinking alcohol with the narcotic pain medicines, advised against driving or handling machinery while adjusting the dose of medicines or if having cognitive  issues related to the current medications. Risk of overdose and death, if medicines not taken as prescribed, were also discussed. If the patient develops new symptoms or if the symptoms worsen, the patient should call the office.     While transcribing every attempt was made to maintain the accuracy of the note in terms of it's contents,there may have been some errors made inadvertently. Thank you for allowing me to participate in the care of this patient.     Halima Nolen MD.    Cc: [phone] [facility]

## 2023-01-04 ENCOUNTER — OFFICE VISIT (OUTPATIENT)
Dept: PAIN MANAGEMENT | Age: 48
End: 2023-01-04

## 2023-01-04 VITALS
SYSTOLIC BLOOD PRESSURE: 116 MMHG | BODY MASS INDEX: 29.95 KG/M2 | WEIGHT: 227 LBS | DIASTOLIC BLOOD PRESSURE: 74 MMHG | HEART RATE: 81 BPM

## 2023-01-04 DIAGNOSIS — S13.9XXA SPRAIN OF NECK, INITIAL ENCOUNTER: ICD-10-CM

## 2023-01-04 DIAGNOSIS — M75.40 IMPINGEMENT SYNDROME OF SHOULDER, UNSPECIFIED LATERALITY: ICD-10-CM

## 2023-01-04 RX ORDER — HYDROCODONE BITARTRATE AND ACETAMINOPHEN 5; 300 MG/1; MG/1
1 TABLET ORAL EVERY 6 HOURS PRN
Qty: 90 TABLET | Refills: 0 | Status: SHIPPED | OUTPATIENT
Start: 2023-01-04 | End: 2023-02-22

## 2023-01-04 NOTE — PROGRESS NOTES
Daniel Mathur II  1975  [id number]      HISTORY OF PRESENT ILLNESS:  Mr. Tiburcio Brush is a 41 y.o. male returns for a follow up visit for pain management  He has a diagnosis of   1. BWC-Impingement syndrome of shoulder, laterality    2. BWC-Sprain of neck, initial encounter    . He complains of pain in the  head, neck, right shoulder, right elbow, upper back, mid back, lower back with radiation to the right arm  He rates the pain 7/10 and describes it as aching, numbness. Current treatment regimen has helped relieve about 70% of the pain. He denies any side effects from the current pain regimen. Patient reports that since the last follow up visit the physical functioning is unchanged, family/social relationships are better, mood is unchanged sleep patterns are unchanged, and that the overall functioning is better. Patient denies misusing/abusing his narcotic pain medications or using any illegal drugs. There are No indicators for possible drug abuse, addiction or diversion problems. Patient reports he has been doing fair, pain has been manageable. He says he is using Xodol 2 x per day along with Ibuprofen as needed. He complains his neck and shoulder hurts the most. He says he is using working full time. He denies any constipation symptoms. ALLERGIES: Patients list of allergies were reviewed     MEDICATIONS: Mr. Tiburcio Brush list of medications were reviewed. His current medications are   Outpatient Medications Prior to Visit   Medication Sig Dispense Refill    metFORMIN (GLUCOPHAGE) 500 MG tablet Take 500 mg by mouth 2 times daily (with meals)      HYDROcodone-acetaminophen (XODOL) 5-300 MG TABS Take 1 tablet by mouth every 6 hours as needed for Pain (max 2-3 per day) for up to 42 days. 90 tablet 0    esomeprazole (NEXIUM) 40 MG delayed release capsule Take 40 mg by mouth every morning (before breakfast). No facility-administered medications prior to visit.         REVIEW OF SYSTEMS:    Respiratory: Negative for apnea, chest tightness and shortness of breath or change in baseline breathing. PHYSICAL EXAM:   Nursing note and vitals reviewed. /74   Pulse 81   Wt 227 lb (103 kg)   BMI 29.95 kg/m²   Constitutional: He appears well-developed and well-nourished. No acute distress. Cardiovascular: Normal rate, regular rhythm, normal heart sounds, and does not have murmur. Pulmonary/Chest: Effort normal. No respiratory distress. He does not have wheezes in the lung fields. He has no rales. Neurological/Psychiatric:He is alert and oriented to person, place, and time. Coordination is  normal.  His mood isAppropriate and affect is Neutral/Euthymic(normal) . IMPRESSION:   1. BWC-Impingement syndrome of shoulder, laterality    2. BWC-Sprain of neck, initial encounter        PLAN:  Informed verbal consent was obtained  -ROM/Stretching exercises as advised   -He was advised to increase fluids ( 5-7  glasses of fluid daily), limit caffeine, avoid cheese products, increase dietary fiber, increase activity and exercise as tolerated and relax regularly and enjoy meals   -Continue with Xodol 1-2 per day   -Neck postural exercises as advised    Current Outpatient Medications   Medication Sig Dispense Refill    metFORMIN (GLUCOPHAGE) 500 MG tablet Take 500 mg by mouth 2 times daily (with meals)      HYDROcodone-acetaminophen (XODOL) 5-300 MG TABS Take 1 tablet by mouth every 6 hours as needed for Pain (max 2-3 per day) for up to 42 days. 90 tablet 0    esomeprazole (NEXIUM) 40 MG delayed release capsule Take 40 mg by mouth every morning (before breakfast). No current facility-administered medications for this visit. I will continue his current medication regimen  which is part of the above treatment schedule.  It has been helping with Mr. Sally Huber chronic  medical problems which for this visit include:   Diagnoses of BWC-Impingement syndrome of shoulder, laterality and BWC-Sprain of neck, initial encounter were pertinent to this visit. Risks and benefits of the medications and other alternative treatments  including no treatment were discussed with the patient. The common side effects of these medications were also explained to the patient. Informed verbal consent was obtained. Goals of current treatment regimen include improvement in pain, restoration of functioning- with focus on improvement in physical performance, general activity, work or disability,emotional distress, health care utilization and  decreased medication consumption. Will continue to monitor progress towards achieving/maintaining therapeutic goals with special emphasis on  1. Improvement in perceived interfernce  of pain with ADL's. Ability to do home exercises independently. Ability to do household chores indoor and/or outdoor work and social and leisure activities. Improve psychosocial and physical functioning. - he is showing progression towards this treatment goal with the current regimen. 2.Ability to focus/concentrate at work and perform the duties required of him at work  Sit through a workday without lower extremity symptoms. Stand 30-60 minutes without lower extremity symptoms. Ability to lift up to 10-20 lbs. Ability to go up and down stairs. Sit 30-60 minutes  Without having to stand up frequently. - he is maintaining/progressing towards his work related goals with the current regimen. He was advised against drinking alcohol with the narcotic pain medicines, advised against driving or handling machinery while adjusting the dose of medicines or if having cognitive  issues related to the current medications. Risk of overdose and death, if medicines not taken as prescribed, were also discussed. If the patient develops new symptoms or if the symptoms worsen, the patient should call the office.     While transcribing every attempt was made to maintain the accuracy of the note in terms of it's contents,there may have been some errors made inadvertently. Thank you for allowing me to participate in the care of this patient.     Stefanie Montes MD.    Cc: [facility],

## 2023-02-22 ENCOUNTER — OFFICE VISIT (OUTPATIENT)
Dept: PAIN MANAGEMENT | Age: 48
End: 2023-02-22

## 2023-02-22 VITALS
HEART RATE: 71 BPM | WEIGHT: 220 LBS | HEIGHT: 73 IN | DIASTOLIC BLOOD PRESSURE: 68 MMHG | SYSTOLIC BLOOD PRESSURE: 105 MMHG | BODY MASS INDEX: 29.16 KG/M2 | OXYGEN SATURATION: 99 %

## 2023-02-22 DIAGNOSIS — S13.9XXA SPRAIN OF NECK, INITIAL ENCOUNTER: ICD-10-CM

## 2023-02-22 DIAGNOSIS — M75.40 IMPINGEMENT SYNDROME OF SHOULDER, UNSPECIFIED LATERALITY: ICD-10-CM

## 2023-02-22 RX ORDER — HYDROCODONE BITARTRATE AND ACETAMINOPHEN 5; 300 MG/1; MG/1
1 TABLET ORAL EVERY 6 HOURS PRN
Qty: 90 TABLET | Refills: 0 | Status: SHIPPED | OUTPATIENT
Start: 2023-02-22 | End: 2023-04-12

## 2023-02-22 NOTE — PROGRESS NOTES
Parisa Griffiths II  1975  [id number]      HISTORY OF PRESENT ILLNESS:  Mr. Shalom Hernandes is a 41 y.o. male returns for a follow up visit for pain management  He has a diagnosis of   1. BWC-Impingement syndrome of shoulder, laterality    2. BWC-Sprain of neck, initial encounter    . He complains of pain in the  head, neck, right shoulder, upper back, mid back  He rates the pain 7/10 and describes it as aching, numbness. Current treatment regimen has helped relieve about 70% of the pain. He denies any side effects from the current pain regimen. Patient reports that since the last follow up visit the physical functioning is better, family/social relationships are unchanged, mood is unchanged sleep patterns are unchanged, and that the overall functioning is better. Patient denies misusing/abusing his narcotic pain medications or using any illegal drugs. There are No indicators for possible drug abuse, addiction or diversion problems. Patient states he has been doing fair, he is managing with the medications. Mr. Shalom Hernandes mentions he is using Xodol 2 per day along with the other adjuvants. Patient states his neck and shoulder is the worst. Patient denies any constipation symptoms. He reports he is working 40 hours a week. ALLERGIES: Patients list of allergies were reviewed     MEDICATIONS: Mr. Shalom Hernandes list of medications were reviewed. His current medications are   Outpatient Medications Prior to Visit   Medication Sig Dispense Refill    metFORMIN (GLUCOPHAGE) 500 MG tablet Take 500 mg by mouth 2 times daily (with meals)      HYDROcodone-acetaminophen (XODOL) 5-300 MG TABS Take 1 tablet by mouth every 6 hours as needed for Pain (max 2-3 per day) for up to 49 days. 90 tablet 0    esomeprazole (NEXIUM) 40 MG delayed release capsule Take 40 mg by mouth every morning (before breakfast). No facility-administered medications prior to visit.         REVIEW OF SYSTEMS:    Respiratory: Negative for apnea, chest tightness and shortness of breath or change in baseline breathing. PHYSICAL EXAM:   Nursing note and vitals reviewed. /68   Pulse 71   Ht 6' 1\" (1.854 m)   Wt 220 lb (99.8 kg)   SpO2 99%   BMI 29.03 kg/m²   Constitutional: He appears well-developed and well-nourished. No acute distress. Cardiovascular: Normal rate, regular rhythm, normal heart sounds, and does not have murmur. Pulmonary/Chest: Effort normal. No respiratory distress. He does not have wheezes in the lung fields. He has no rales. Neurological/Psychiatric:He is alert and oriented to person, place, and time. Coordination is  normal.  His mood isAppropriate and affect is Neutral/Euthymic(normal) . His    IMPRESSION:   1. BWC-Impingement syndrome of shoulder, laterality    2. BWC-Sprain of neck, initial encounter        PLAN:  Informed verbal consent was obtained  -ROM/Stretching exercises as advised   -Continue with Xodol 2 per day  -He was advised to increase fluids ( 5-7  glasses of fluid daily), limit caffeine, avoid cheese products, increase dietary fiber, increase activity and exercise as tolerated and relax regularly and enjoy meals   -Neck postural exercises as advised   Current Outpatient Medications   Medication Sig Dispense Refill    metFORMIN (GLUCOPHAGE) 500 MG tablet Take 500 mg by mouth 2 times daily (with meals)      HYDROcodone-acetaminophen (XODOL) 5-300 MG TABS Take 1 tablet by mouth every 6 hours as needed for Pain (max 2-3 per day) for up to 49 days. 90 tablet 0    esomeprazole (NEXIUM) 40 MG delayed release capsule Take 40 mg by mouth every morning (before breakfast). No current facility-administered medications for this visit. I will continue his current medication regimen  which is part of the above treatment schedule.  It has been helping with Mr. Michelle Cruz chronic  medical problems which for this visit include:   Diagnoses of BWC-Impingement syndrome of shoulder, laterality and BWC-Sprain of neck, initial encounter were pertinent to this visit. Risks and benefits of the medications and other alternative treatments  including no treatment were discussed with the patient. The common side effects of these medications were also explained to the patient. Informed verbal consent was obtained. Goals of current treatment regimen include improvement in pain, restoration of functioning- with focus on improvement in physical performance, general activity, work or disability,emotional distress, health care utilization and  decreased medication consumption. Will continue to monitor progress towards achieving/maintaining therapeutic goals with special emphasis on  1. Improvement in perceived interfernce  of pain with ADL's. Ability to do home exercises independently. Ability to do household chores indoor and/or outdoor work and social and leisure activities. Improve psychosocial and physical functioning. - he is showing progression towards this treatment goal with the current regimen. He was advised against drinking alcohol with the narcotic pain medicines, advised against driving or handling machinery while adjusting the dose of medicines or if having cognitive  issues related to the current medications. Risk of overdose and death, if medicines not taken as prescribed, were also discussed. If the patient develops new symptoms or if the symptoms worsen, the patient should call the office. While transcribing every attempt was made to maintain the accuracy of the note in terms of it's contents,there may have been some errors made inadvertently. Thank you for allowing me to participate in the care of this patient.     Aarti Schneider MD.    Cc: [id number] [facility]

## 2023-04-12 ENCOUNTER — OFFICE VISIT (OUTPATIENT)
Dept: PAIN MANAGEMENT | Age: 48
End: 2023-04-12

## 2023-04-12 VITALS
SYSTOLIC BLOOD PRESSURE: 124 MMHG | BODY MASS INDEX: 29.29 KG/M2 | WEIGHT: 222 LBS | HEART RATE: 76 BPM | DIASTOLIC BLOOD PRESSURE: 83 MMHG

## 2023-04-12 DIAGNOSIS — M75.40 IMPINGEMENT SYNDROME OF SHOULDER, UNSPECIFIED LATERALITY: ICD-10-CM

## 2023-04-12 DIAGNOSIS — S13.9XXA SPRAIN OF NECK, INITIAL ENCOUNTER: ICD-10-CM

## 2023-04-12 RX ORDER — HYDROCODONE BITARTRATE AND ACETAMINOPHEN 5; 300 MG/1; MG/1
1 TABLET ORAL EVERY 6 HOURS PRN
Qty: 90 TABLET | Refills: 0 | Status: SHIPPED | OUTPATIENT
Start: 2023-04-12 | End: 2023-05-31

## 2023-05-19 RX ORDER — ATORVASTATIN CALCIUM 10 MG/1
TABLET, FILM COATED ORAL
COMMUNITY
Start: 2023-03-20

## 2023-05-25 ENCOUNTER — ANESTHESIA EVENT (OUTPATIENT)
Dept: ENDOSCOPY | Age: 48
End: 2023-05-25
Payer: COMMERCIAL

## 2023-05-26 ENCOUNTER — HOSPITAL ENCOUNTER (OUTPATIENT)
Age: 48
Setting detail: OUTPATIENT SURGERY
Discharge: HOME | End: 2023-05-26
Attending: INTERNAL MEDICINE | Admitting: INTERNAL MEDICINE
Payer: COMMERCIAL

## 2023-05-26 ENCOUNTER — ANESTHESIA (OUTPATIENT)
Dept: ENDOSCOPY | Age: 48
End: 2023-05-26
Payer: COMMERCIAL

## 2023-05-26 VITALS
DIASTOLIC BLOOD PRESSURE: 78 MMHG | SYSTOLIC BLOOD PRESSURE: 123 MMHG | OXYGEN SATURATION: 95 % | HEART RATE: 57 BPM | TEMPERATURE: 97 F | HEIGHT: 73 IN | RESPIRATION RATE: 16 BRPM | WEIGHT: 212.85 LBS | BODY MASS INDEX: 28.21 KG/M2

## 2023-05-26 DIAGNOSIS — Z12.11 SCREEN FOR COLON CANCER: ICD-10-CM

## 2023-05-26 DIAGNOSIS — K21.9 GASTROESOPHAGEAL REFLUX DISEASE WITHOUT ESOPHAGITIS: ICD-10-CM

## 2023-05-26 LAB
GLUCOSE BLD-MCNC: 109 MG/DL (ref 70–99)
GLUCOSE BLD-MCNC: 116 MG/DL (ref 70–99)
PERFORMED ON: ABNORMAL
PERFORMED ON: ABNORMAL

## 2023-05-26 PROCEDURE — 88305 TISSUE EXAM BY PATHOLOGIST: CPT

## 2023-05-26 PROCEDURE — 3700000001 HC ADD 15 MINUTES (ANESTHESIA): Performed by: INTERNAL MEDICINE

## 2023-05-26 PROCEDURE — 6360000002 HC RX W HCPCS: Performed by: NURSE ANESTHETIST, CERTIFIED REGISTERED

## 2023-05-26 PROCEDURE — 2580000003 HC RX 258: Performed by: ANESTHESIOLOGY

## 2023-05-26 PROCEDURE — 7100000011 HC PHASE II RECOVERY - ADDTL 15 MIN: Performed by: INTERNAL MEDICINE

## 2023-05-26 PROCEDURE — 3609027000 HC COLONOSCOPY: Performed by: INTERNAL MEDICINE

## 2023-05-26 PROCEDURE — 2709999900 HC NON-CHARGEABLE SUPPLY: Performed by: INTERNAL MEDICINE

## 2023-05-26 PROCEDURE — 2500000003 HC RX 250 WO HCPCS: Performed by: NURSE ANESTHETIST, CERTIFIED REGISTERED

## 2023-05-26 PROCEDURE — 7100000010 HC PHASE II RECOVERY - FIRST 15 MIN: Performed by: INTERNAL MEDICINE

## 2023-05-26 PROCEDURE — 7100000001 HC PACU RECOVERY - ADDTL 15 MIN: Performed by: INTERNAL MEDICINE

## 2023-05-26 PROCEDURE — 3609012400 HC EGD TRANSORAL BIOPSY SINGLE/MULTIPLE: Performed by: INTERNAL MEDICINE

## 2023-05-26 PROCEDURE — 3700000000 HC ANESTHESIA ATTENDED CARE: Performed by: INTERNAL MEDICINE

## 2023-05-26 PROCEDURE — 7100000000 HC PACU RECOVERY - FIRST 15 MIN: Performed by: INTERNAL MEDICINE

## 2023-05-26 RX ORDER — SODIUM CHLORIDE 0.9 % (FLUSH) 0.9 %
5-40 SYRINGE (ML) INJECTION EVERY 12 HOURS SCHEDULED
Status: DISCONTINUED | OUTPATIENT
Start: 2023-05-26 | End: 2023-05-26 | Stop reason: HOSPADM

## 2023-05-26 RX ORDER — LIDOCAINE HYDROCHLORIDE 20 MG/ML
INJECTION, SOLUTION EPIDURAL; INFILTRATION; INTRACAUDAL; PERINEURAL PRN
Status: DISCONTINUED | OUTPATIENT
Start: 2023-05-26 | End: 2023-05-26 | Stop reason: SDUPTHER

## 2023-05-26 RX ORDER — PROPOFOL 10 MG/ML
INJECTION, EMULSION INTRAVENOUS PRN
Status: DISCONTINUED | OUTPATIENT
Start: 2023-05-26 | End: 2023-05-26 | Stop reason: SDUPTHER

## 2023-05-26 RX ORDER — SODIUM CHLORIDE 9 MG/ML
INJECTION, SOLUTION INTRAVENOUS PRN
Status: DISCONTINUED | OUTPATIENT
Start: 2023-05-26 | End: 2023-05-26 | Stop reason: HOSPADM

## 2023-05-26 RX ORDER — FENTANYL CITRATE 50 UG/ML
INJECTION, SOLUTION INTRAMUSCULAR; INTRAVENOUS PRN
Status: DISCONTINUED | OUTPATIENT
Start: 2023-05-26 | End: 2023-05-26 | Stop reason: SDUPTHER

## 2023-05-26 RX ORDER — SODIUM CHLORIDE 0.9 % (FLUSH) 0.9 %
5-40 SYRINGE (ML) INJECTION PRN
Status: DISCONTINUED | OUTPATIENT
Start: 2023-05-26 | End: 2023-05-26 | Stop reason: HOSPADM

## 2023-05-26 RX ORDER — TADALAFIL 5 MG/1
5 TABLET ORAL PRN
COMMUNITY

## 2023-05-26 RX ADMIN — PROPOFOL 200 MCG/KG/MIN: 10 INJECTION, EMULSION INTRAVENOUS at 09:25

## 2023-05-26 RX ADMIN — FENTANYL CITRATE 25 MCG: 50 INJECTION INTRAMUSCULAR; INTRAVENOUS at 09:27

## 2023-05-26 RX ADMIN — FENTANYL CITRATE 50 MCG: 50 INJECTION INTRAMUSCULAR; INTRAVENOUS at 09:22

## 2023-05-26 RX ADMIN — PROPOFOL 150 MG: 10 INJECTION, EMULSION INTRAVENOUS at 09:22

## 2023-05-26 RX ADMIN — SODIUM CHLORIDE: 9 INJECTION, SOLUTION INTRAVENOUS at 08:38

## 2023-05-26 RX ADMIN — FENTANYL CITRATE 25 MCG: 50 INJECTION INTRAMUSCULAR; INTRAVENOUS at 09:33

## 2023-05-26 RX ADMIN — LIDOCAINE HYDROCHLORIDE 80 MG: 20 INJECTION, SOLUTION EPIDURAL; INFILTRATION; INTRACAUDAL; PERINEURAL at 09:22

## 2023-05-26 ASSESSMENT — PAIN SCALES - GENERAL
PAINLEVEL_OUTOF10: 0
PAINLEVEL_OUTOF10: 0

## 2023-05-26 ASSESSMENT — PAIN - FUNCTIONAL ASSESSMENT: PAIN_FUNCTIONAL_ASSESSMENT: 0-10

## 2023-05-26 NOTE — ANESTHESIA POSTPROCEDURE EVALUATION
Department of Anesthesiology  Postprocedure Note    Patient: Dominique Ervin  MRN: [id number]  YOB: 1975  Date of evaluation: 5/26/2023      Procedure Summary     Date: 05/26/23 Room / Location: [address] [place] [address]    Anesthesia Start: 5305 Anesthesia Stop: 0945    Procedures:       EGD BIOPSY      COLONOSCOPY Diagnosis:       Screen for colon cancer      Gastroesophageal reflux disease without esophagitis      (SCREENING FOR COLON CANCER, GASTROESOPHAGEAL REFLUX DISEASE WITHOUT ESOPHAGITIS)    Surgeons: Reji Santiago MD Responsible Provider: Moose Florence MD    Anesthesia Type: MAC ASA Status: 2          Anesthesia Type: No value filed.     Angy Phase I: Angy Score: 9    Angy Phase II: Angy Score: 10      Anesthesia Post Evaluation    Patient location during evaluation: bedside  Patient participation: complete - patient participated  Level of consciousness: awake and alert  Pain score: 0  Airway patency: patent  Nausea & Vomiting: no vomiting  Complications: no  Cardiovascular status: blood pressure returned to baseline  Respiratory status: acceptable  Hydration status: euvolemic

## 2023-05-26 NOTE — PROGRESS NOTES
Patient to PACU from Endo. Pt awakens easily on arrival. VS stable (see flowsheet) on 2L NC. Abd soft. Pt denies pain.

## 2023-05-26 NOTE — ANESTHESIA PRE PROCEDURE
Department of Anesthesiology  Preprocedure Note       Name:  Antonio Fletcher   Age:  41 y.o.  :  1975                                          MRN:  [id number]         Date:  2023      Surgeon: Jeremias Collazo):  Monster Sorto MD    Procedure: Procedure(s):  ESOPHAGOGASTRODUODENOSCOPY  COLONOSCOPY    Medications prior to admission:   Prior to Admission medications    Medication Sig Start Date End Date Taking? Authorizing Provider   tadalafil (CIALIS) 5 MG tablet Take 1 tablet by mouth as needed for Erectile Dysfunction   Yes Historical Provider, MD   atorvastatin (LIPITOR) 10 MG tablet  3/20/23   Historical Provider, MD   HYDROcodone-acetaminophen (XODOL) 5-300 MG TABS Take 1 tablet by mouth every 6 hours as needed for Pain (max 2-3 per day) for up to 49 days. 23  Jose Lacy MD   metFORMIN (GLUCOPHAGE) 500 MG tablet Take 1 tablet by mouth 2 times daily (with meals)    Historical Provider, MD   esomeprazole (NEXIUM) 40 MG delayed release capsule Take 1 capsule by mouth every morning (before breakfast)    Historical Provider, MD       Current medications:    Current Facility-Administered Medications   Medication Dose Route Frequency Provider Last Rate Last Admin    sodium chloride flush 0.9 % injection 5-40 mL  5-40 mL IntraVENous 2 times per day James Saavedra MD        sodium chloride flush 0.9 % injection 5-40 mL  5-40 mL IntraVENous PRN James Saavedra MD        0.9 % sodium chloride infusion   IntraVENous PRN James Saavedra MD 75 mL/hr at 23 0838 New Bag at 23 1831       Allergies: Allergies   Allergen Reactions    Adhesive Tape Dermatitis     Skin was red under band aid tape    Morphine Hives and Rash       Problem List:    Patient Active Problem List   Diagnosis Code    Chronic pain syndrome G89.4    [facility] Neck sprain and strain S13. 9XXA    Myalgia and myositis [id number]    Insomnia G47.00    GERD (gastroesophageal reflux disease) K21.9   

## 2023-05-26 NOTE — OP NOTE
Esophagogastroduodenoscopy and Colonoscopy Note    Patient:   Consuelo Landers    :    1975    Facility:   [facility] [Outpatient]   Referring/PCP: Liane Riggs MD    Procedure:   Esophagogastroduodenoscopy and Colonoscopy  Date:     2023   Endoscopist:  Kathia Gonzalez MD     Preoperative Diagnosis:   Reflux, screening colonoscopy. 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 Anesthesia:  MAC    Estimated blood loss: Minimal    Complications: None    Specimen: Biopsy of distal esophagus. Instrument: ,     Description of Procedure:  Informed consent was obtained from the patient after explanation of the procedures including indications, description of the procedures,  benefits and possible risks and complications of the procedures, and alternatives. Questions were answered. The patient's history was reviewed and a directed physical examination was performed prior to the procedures. Patient was monitored throughout the procedures with pulse oximetry and periodic assessment of vital signs. Patient was sedated as noted above. With the patient in the left lateral decubitus position, the Olympus videoendoscope was placed in the patient's mouth and under direct visualization passed into the esophagus. Visualization of the esophagus, stomach, and duodenum was performed during both introduction and withdrawal of the endoscope and retroflexed view of the proximal stomach was obtained. The scope was passed to the 2nd portion of the duodenum. Next, with the patient in the left lateral decubitus position, a digital rectal examination was performed and revealed negative without mass, lesions or tenderness. The Olympus video colonoscope was placed in the patient's rectum and advanced without difficulty  to the cecum, which was identified by the ileocecal valve and appendiceal orifice. The prep was excellent.

## 2023-05-26 NOTE — DISCHARGE INSTRUCTIONS
Discharge Instructions for Colonoscopy     Colonoscopy is a visual exam of the lining of the large intestine, also called the bowel or colon, with a colonoscope. A colonoscope is a flexible tube with a light and a viewing device. It allows the doctor to view the inside of the colon through a tiny video camera. Colonoscopy is performed for many reasons: unexplained anemia , pain, diarrhea , bloody stools, cancer screening, among many other reasons. Complications from a colonoscopy are rare. Some possible serious complications include perforated bowel (which might require surgery) and bleeding (which could require blood transfusion ). Minor complications include bloating, gas, and cramping that can last for 1-2 days after the procedure. Because air is put into your colon during the procedure, it is normal to pass large amounts of air from your rectum. You may not have a bowel movement for 1-3 days after the procedure. What You Will Need:  Someone to drive you home after the procedure     Steps to Take:  [address] when you get home. Because the sedative will make you drowsy, don't drive, operate machinery, or make important decisions the day of the procedure. Feelings of bloating, gas, or cramping may persist for 24 hours. Diet -  Try sips of water first. If tolerated, resume bland food (scrambled eggs, toast, soup) first.  If tolerated, resume regular diet or the diet recommended by your physician. Do not drink alcohol for 24 hours. Physical Activity -  Ask your doctor when you will be able to return to work. Do not drive, operate heavy machinery, or do activities that require coordination or balance for 24 hours. Otherwise, return to your normal routine as soon as you are comfortable to do so, which is usually the next day after the procedure. Medications - When taking medications, it's important to:    Take your medication as directed, not more, not less, not at a different

## 2023-05-26 NOTE — PROGRESS NOTES
Vss. Tolerated sitting up and po fluids and crackers well. Family at bedside. Verbalized understanding of discharge instructions.

## 2023-05-26 NOTE — H&P
[facility]   Attending Pre-operative History and Physical      PROCEDURE:  EGD and Colonoscopy    Indication:The patient is a 41 y.o. male presents for an EGD and Colonoscopy. Past Medical History:    Past Medical History:   Diagnosis Date    Arthritis     Cancer ([address]     basal cell-skin    Chronic pain syndrome     Diabetes mellitus (HCC)     GERD (gastroesophageal reflux disease)     History of kidney stones     Hyperlipidemia     Impingement syndrome, shoulder     Insomnia, unspecified     Myalgia and myositis, unspecified     Right shoulder pain     Shoulder impingement syndrome     Sprain of neck     Trapezius strain       Past Surgical History:    Past Surgical History:   Procedure Laterality Date    ENDOSCOPY, COLON, DIAGNOSTIC      dilation    HERNIA REPAIR  01/01/2002    KIDNEY STONE SURGERY  2004, 2010    ROTATOR CUFF REPAIR Right 2007    right clavicle    TONSILLECTOMY  01/01/1982      Medications Prior to Admission:   Prior to Admission medications    Medication Sig Start Date End Date Taking? Authorizing Provider   tadalafil (CIALIS) 5 MG tablet Take 1 tablet by mouth as needed for Erectile Dysfunction   Yes Historical Provider, MD   atorvastatin (LIPITOR) 10 MG tablet  3/20/23   Historical Provider, MD   HYDROcodone-acetaminophen (XODOL) 5-300 MG TABS Take 1 tablet by mouth every 6 hours as needed for Pain (max 2-3 per day) for up to 49 days.  4/12/23 5/31/23  Marco Antonio Carrington MD   metFORMIN (GLUCOPHAGE) 500 MG tablet Take 1 tablet by mouth 2 times daily (with meals)    Historical Provider, MD   esomeprazole (NEXIUM) 40 MG delayed release capsule Take 1 capsule by mouth every morning (before breakfast)    Historical Provider, MD        Allergies:  Adhesive tape and Morphine  History of allergic reaction to anesthesia:  No    Social History:   Social History     Socioeconomic History    Marital status:      Spouse name: Not on file    Number of children: Not on file    Years of education:

## 2023-05-31 ENCOUNTER — OFFICE VISIT (OUTPATIENT)
Dept: PAIN MANAGEMENT | Age: 48
End: 2023-05-31

## 2023-05-31 VITALS
WEIGHT: 223 LBS | DIASTOLIC BLOOD PRESSURE: 80 MMHG | BODY MASS INDEX: 29.42 KG/M2 | HEART RATE: 95 BPM | OXYGEN SATURATION: 100 % | SYSTOLIC BLOOD PRESSURE: 119 MMHG

## 2023-05-31 DIAGNOSIS — S13.9XXA SPRAIN OF NECK, INITIAL ENCOUNTER: ICD-10-CM

## 2023-05-31 DIAGNOSIS — M75.40 IMPINGEMENT SYNDROME OF SHOULDER, UNSPECIFIED LATERALITY: ICD-10-CM

## 2023-05-31 LAB
ALBUMIN SERPL-MCNC: 4.9 G/DL (ref 3.4–5)
ALBUMIN/GLOB SERPL: 2 {RATIO} (ref 1.1–2.2)
ALP SERPL-CCNC: 75 U/L (ref 40–129)
ALT SERPL-CCNC: 28 U/L (ref 10–40)
ANION GAP SERPL CALCULATED.3IONS-SCNC: 11 MMOL/L (ref 3–16)
AST SERPL-CCNC: 31 U/L (ref 15–37)
BILIRUB SERPL-MCNC: 0.4 MG/DL (ref 0–1)
BUN SERPL-MCNC: 15 MG/DL (ref 7–20)
CALCIUM SERPL-MCNC: 9.6 MG/DL (ref 8.3–10.6)
CHLORIDE SERPL-SCNC: 103 MMOL/L (ref 99–110)
CO2 SERPL-SCNC: 27 MMOL/L (ref 21–32)
CREAT SERPL-MCNC: 1 MG/DL (ref 0.9–1.3)
GFR SERPLBLD CREATININE-BSD FMLA CKD-EPI: >60 ML/MIN/{1.73_M2}
GLUCOSE SERPL-MCNC: 102 MG/DL (ref 70–99)
POTASSIUM SERPL-SCNC: 4.5 MMOL/L (ref 3.5–5.1)
PROT SERPL-MCNC: 7.4 G/DL (ref 6.4–8.2)
SODIUM SERPL-SCNC: 141 MMOL/L (ref 136–145)

## 2023-05-31 RX ORDER — HYDROCODONE BITARTRATE AND ACETAMINOPHEN 5; 300 MG/1; MG/1
1 TABLET ORAL EVERY 6 HOURS PRN
Qty: 90 TABLET | Refills: 0 | Status: SHIPPED | OUTPATIENT
Start: 2023-05-31 | End: 2023-07-19

## 2023-05-31 NOTE — PROGRESS NOTES
Adelaida Higgins   1975  [id number]      HISTORY OF PRESENT ILLNESS:  Mr. Tunde Cook is a 41 y.o. male returns for a follow up visit for pain management  He has a diagnosis of   1. BWC-Impingement syndrome of shoulder, laterality    2. BWC-Sprain of neck, initial encounter    . He complains of pain in the  Neck, upper and lower back, right shoulder, right upper arm   He rates the pain 7/10 and describes it as sharp, aching, burning. Current treatment regimen has helped relieve about 70% of the pain. He denies any side effects from the current pain regimen. Patient reports that since the last follow up visit the physical functioning is better, family/social relationships are unchanged, mood is unchanged sleep patterns are unchanged, and that the overall functioning is better. Patient denies misusing/abusing his narcotic pain medications or using any illegal drugs. There are No indicators for possible drug abuse, addiction or diversion problems. Patient states he has been doing fair, he is managing okay with the medications. Mr. Tunde Cook mentions he is using Xodol 1-2 per day still. Patient reports he had labs done, shows elevated A1C, he is on medications for Diabetes Mellitus. He reports he is working full time still. ALLERGIES: Patients list of allergies were reviewed     MEDICATIONS: Mr. Tunde Cook list of medications were reviewed. His current medications are   Outpatient Medications Prior to Visit   Medication Sig Dispense Refill    tadalafil (CIALIS) 5 MG tablet Take 1 tablet by mouth as needed for Erectile Dysfunction      atorvastatin (LIPITOR) 10 MG tablet       HYDROcodone-acetaminophen (XODOL) 5-300 MG TABS Take 1 tablet by mouth every 6 hours as needed for Pain (max 2-3 per day) for up to 49 days.  90 tablet 0    metFORMIN (GLUCOPHAGE) 500 MG tablet Take 1 tablet by mouth 2 times daily (with meals)      esomeprazole (NEXIUM) 40 MG delayed release capsule Take 1 capsule by mouth every morning

## 2023-07-19 ENCOUNTER — OFFICE VISIT (OUTPATIENT)
Dept: PAIN MANAGEMENT | Age: 48
End: 2023-07-19

## 2023-07-19 VITALS
BODY MASS INDEX: 29.55 KG/M2 | DIASTOLIC BLOOD PRESSURE: 77 MMHG | OXYGEN SATURATION: 97 % | WEIGHT: 224 LBS | SYSTOLIC BLOOD PRESSURE: 119 MMHG | HEART RATE: 77 BPM

## 2023-07-19 DIAGNOSIS — S13.9XXA SPRAIN OF NECK, INITIAL ENCOUNTER: ICD-10-CM

## 2023-07-19 DIAGNOSIS — M75.40 IMPINGEMENT SYNDROME OF SHOULDER, UNSPECIFIED LATERALITY: ICD-10-CM

## 2023-07-19 RX ORDER — HYDROCODONE BITARTRATE AND ACETAMINOPHEN 5; 300 MG/1; MG/1
1 TABLET ORAL EVERY 6 HOURS PRN
Qty: 90 TABLET | Refills: 0 | Status: SHIPPED | OUTPATIENT
Start: 2023-07-19 | End: 2023-09-06

## 2023-09-06 ENCOUNTER — OFFICE VISIT (OUTPATIENT)
Dept: PAIN MANAGEMENT | Age: 48
End: 2023-09-06

## 2023-09-06 VITALS
HEART RATE: 71 BPM | DIASTOLIC BLOOD PRESSURE: 74 MMHG | SYSTOLIC BLOOD PRESSURE: 111 MMHG | WEIGHT: 226 LBS | BODY MASS INDEX: 29.82 KG/M2

## 2023-09-06 DIAGNOSIS — S13.9XXA SPRAIN OF NECK, INITIAL ENCOUNTER: ICD-10-CM

## 2023-09-06 DIAGNOSIS — M75.40 IMPINGEMENT SYNDROME OF SHOULDER, UNSPECIFIED LATERALITY: ICD-10-CM

## 2023-09-06 NOTE — PROGRESS NOTES
up to 10-20 lbs. Ability to go up and down stairs. Sit 30-60 minutes  Without having to stand up frequently. - he is maintaining/progressing towards his work related goals with the current regimen. He was advised against drinking alcohol with the narcotic pain medicines, advised against driving or handling machinery while adjusting the dose of medicines or if having cognitive  issues related to the current medications. Risk of overdose and death, if medicines not taken as prescribed, were also discussed. If the patient develops new symptoms or if the symptoms worsen, the patient should call the office. While transcribing every attempt was made to maintain the accuracy of the note in terms of it's contents,there may have been some errors made inadvertently. Thank you for allowing me to participate in the care of this patient.     Jordin Roach MD.    Cc: Isi Cortez MD

## 2023-09-07 RX ORDER — HYDROCODONE BITARTRATE AND ACETAMINOPHEN 5; 300 MG/1; MG/1
1 TABLET ORAL EVERY 6 HOURS PRN
Qty: 90 TABLET | Refills: 0 | Status: SHIPPED | OUTPATIENT
Start: 2023-09-07 | End: 2023-10-26

## 2023-10-25 ENCOUNTER — OFFICE VISIT (OUTPATIENT)
Dept: PAIN MANAGEMENT | Age: 48
End: 2023-10-25
Payer: COMMERCIAL

## 2023-10-25 VITALS
BODY MASS INDEX: 30.08 KG/M2 | WEIGHT: 228 LBS | SYSTOLIC BLOOD PRESSURE: 121 MMHG | HEART RATE: 73 BPM | DIASTOLIC BLOOD PRESSURE: 79 MMHG

## 2023-10-25 DIAGNOSIS — S13.9XXA SPRAIN OF NECK, INITIAL ENCOUNTER: ICD-10-CM

## 2023-10-25 DIAGNOSIS — M75.40 IMPINGEMENT SYNDROME OF SHOULDER, UNSPECIFIED LATERALITY: ICD-10-CM

## 2023-10-25 PROCEDURE — 99213 OFFICE O/P EST LOW 20 MIN: CPT | Performed by: INTERNAL MEDICINE

## 2023-10-25 RX ORDER — HYDROCODONE BITARTRATE AND ACETAMINOPHEN 5; 300 MG/1; MG/1
1 TABLET ORAL EVERY 6 HOURS PRN
Qty: 90 TABLET | Refills: 0 | Status: SHIPPED | OUTPATIENT
Start: 2023-10-25 | End: 2023-12-13

## 2023-10-25 NOTE — PROGRESS NOTES
Gregory Castillo   1975  [id number]      HISTORY OF PRESENT ILLNESS:  Mr. Sandro aPtricio is a 57 y.o. male returns for a follow up visit for pain management  He has a diagnosis of   1. BWC-Impingement syndrome of shoulder, laterality    2. BWC-Sprain of neck, initial encounter    . As per information/history obtained from the PADT(patient assessment and documentation tool) -  He complains of pain in the neck, upper back, mid back, and lower back with radiation to the shoulders Right, arms Right, elbows Right, and hands Right He rates the pain 8/10 and describes it as sharp, aching, numbness. Pain is made worse by: movement, walking, standing, sitting, bending, lifting. He denies any side effects from the current pain regimen. Patient reports that since last follow up visit the physical functioning is better, family/social relationships are unchanged, mood is unchanged sleep patterns are unchanged. Mr. Sandro Patricio states that since starting the treatment with the current regimen the  overall functioning  in the above aspects is  better, Patient denies misusing/abusing his narcotic pain medications or using any illegal drugs. There are No indicators for possible drug abuse, addiction or diversion problems. Upon obtaining the medical history from Mr. Sandro Patricio regarding today's office visit for his presenting problems, patient states he is working full time 40 + hrs per week. He complain his neck/shoulder has been hurting. He says he I using Xodol still 1-2 per day. He denies any side effects, constipation or cognitive symptoms. ALLERGIES: Patients list of allergies were reviewed     MEDICATIONS: Mr. Sandro Patricio list of medications were reviewed. His current medications are   Outpatient Medications Prior to Visit   Medication Sig Dispense Refill    HYDROcodone-acetaminophen (XODOL) 5-300 MG TABS Take 1 tablet by mouth every 6 hours as needed for Pain (max 2-3 per day) for up to 49 days.  90 tablet 0    tadalafil

## 2023-11-08 ENCOUNTER — TELEPHONE (OUTPATIENT)
Dept: PAIN MANAGEMENT | Age: 48
End: 2023-11-08

## 2023-11-08 NOTE — TELEPHONE ENCOUNTER
This is the first attempt to call this pt into the office for a pill count. He will need to come into the office today with ALL his medications, pain medications included, for review. If he is unable to come in today, he will need to come into the [place] office tomorrow, or the  [facility]  office the day after that. In the event he is out of town or is unable to get to the office, he will need to go to his local pharmacy to complete the pill count there. Results will need to be on pharmacy letter head with the name and signature of the pharmacist performing the count and will need to be faxed to [phone]. He will go to his local pharmacy to do his medication review I also told him that the pharmacy has to fax over the results of the medication review.

## 2023-11-21 ENCOUNTER — TELEPHONE (OUTPATIENT)
Dept: PAIN MANAGEMENT | Age: 48
End: 2023-11-21

## 2023-11-21 NOTE — TELEPHONE ENCOUNTER
This is the second attempt to call this pt into the office for a pill count. He will need to come into the office today with ALL his medications, pain medications included, for review. If he is unable to come in today, he will need to come into [place] office tomorrow, or the [place] office the day after that. In the event he is out of town or is unable to get to the office, he will need to go to his local pharmacy to complete the pill count there. Results will need to be on pharmacy letter head with the name and signature of the pharmacist performing the count and will need to be faxed to [phone]. He was not available, a message was left.

## 2023-11-22 NOTE — TELEPHONE ENCOUNTER
This is the last attempt to call this pt into the office for a pill count. He will need to come into the office today with ALL his medications, pain medications included, for review. If he is unable to come in today, he will need to come into the [place] office tomorrow, or the  [facility]  office the day after that. In the event he is out of town or is unable to get to the office, he will need to go to his local pharmacy to complete the pill count there. Results will need to be on pharmacy letter head with the name and signature of the pharmacist performing the count and will need to be faxed to [phone]. He was not available, a message was left.

## 2023-11-27 NOTE — TELEPHONE ENCOUNTER
This is the last attempt to call this pt into the office for a pill count. He will need to come into the office today with ALL his medications, pain medications included, for review. If he is unable to come in today, he will need to come into the  [place]  office tomorrow, or the [place] office the day after that. In the event he is out of town or is unable to get to the office, he will need to go to his local pharmacy to complete the pill count there. Results will need to be on pharmacy letter head with the name and signature of the pharmacist performing the count and will need to be faxed to [phone]. He was not available, a message was left.

## 2024-02-07 ENCOUNTER — OFFICE VISIT (OUTPATIENT)
Dept: PAIN MANAGEMENT | Age: 49
End: 2024-02-07

## 2024-02-07 VITALS
OXYGEN SATURATION: 94 % | BODY MASS INDEX: 31 KG/M2 | HEART RATE: 70 BPM | WEIGHT: 235 LBS | SYSTOLIC BLOOD PRESSURE: 113 MMHG | DIASTOLIC BLOOD PRESSURE: 70 MMHG

## 2024-02-07 DIAGNOSIS — M75.40 IMPINGEMENT SYNDROME OF SHOULDER, UNSPECIFIED LATERALITY: ICD-10-CM

## 2024-02-07 DIAGNOSIS — S13.9XXA SPRAIN OF NECK, INITIAL ENCOUNTER: ICD-10-CM

## 2024-02-07 RX ORDER — DICLOFENAC SODIUM 75 MG/1
75 TABLET, DELAYED RELEASE ORAL DAILY PRN
Qty: 30 TABLET | Refills: 1 | Status: SHIPPED | OUTPATIENT
Start: 2024-02-07

## 2024-02-07 NOTE — PROGRESS NOTES
Kishan Dinh   1975  [id number]    HISTORY OF PRESENT ILLNESS:  Mr. Dinh is a 48 y.o. male returns for a follow up visit for multiple medical problems.  His  presenting problems are   1. BWC-Impingement syndrome of shoulder, laterality    2. BWC-Sprain of neck, initial encounter    .    As per information/history obtained from the PADT(patient assessment and documentation tool) -  He complains of pain in the neck, upper back, mid back, and lower back with radiation to the shoulders Right and arms Right He rates the pain 7/10 and describes it as aching.  Pain is made worse by: movement, walking, standing, sitting, bending, lifting.  Current treatment regimen has helped relieve about 70% of the pain.  He denies side effects from the current pain regimen.   Patient reports that since last follow up visit the physical functioning is unchanged, family/social relationships are unchanged, mood is unchanged sleep patterns are worse.  Mr. Dinh states that since starting the treatment with the current regimen the  overall functioning  in the above aspects is  better,Patient denies neurological bowel or bladder. Patient denies misusing/abusing his narcotic pain medications or using any illegal drugs.  There are No indicators for possible drug abuse, addiction or diversion problems. Upon obtaining the medical history from Mr. Dinh regarding today's office visit for his presenting problems, Patient states he has been doing fair, managing with his medications. He says he is working full time. He mentions he's using Voltaren or ibuprofen as needed. He complains he injured his knee and will be going for a MRI       ALLERGIES: Patients list of allergies were reviewed     MEDICATIONS: Mr. Dinh list of medications were reviewed.His current medications are   Outpatient Medications Prior to Visit   Medication Sig Dispense Refill    diclofenac (VOLTAREN) 75 MG EC tablet Take 1 tablet by mouth daily as needed for

## 2024-04-03 ENCOUNTER — OFFICE VISIT (OUTPATIENT)
Dept: PAIN MANAGEMENT | Age: 49
End: 2024-04-03

## 2024-04-03 VITALS
WEIGHT: 236 LBS | BODY MASS INDEX: 31.14 KG/M2 | SYSTOLIC BLOOD PRESSURE: 121 MMHG | HEART RATE: 100 BPM | OXYGEN SATURATION: 95 % | DIASTOLIC BLOOD PRESSURE: 87 MMHG

## 2024-04-03 DIAGNOSIS — M75.40 IMPINGEMENT SYNDROME OF SHOULDER, UNSPECIFIED LATERALITY: ICD-10-CM

## 2024-04-03 DIAGNOSIS — S13.9XXA SPRAIN OF NECK, INITIAL ENCOUNTER: ICD-10-CM

## 2024-04-03 RX ORDER — DICLOFENAC SODIUM 75 MG/1
75 TABLET, DELAYED RELEASE ORAL DAILY PRN
Qty: 30 TABLET | Refills: 2 | Status: SHIPPED | OUTPATIENT
Start: 2024-04-03

## 2024-04-03 NOTE — PROGRESS NOTES
Ability to go up and down stairs.  Sit 30-60 minutes  Without having to stand up frequently. - he is maintaining/progressing towards his work related goals with the current regimen.     He was advised against drinking alcohol with the narcotic pain medicines, advised against driving or handling machinery while adjusting the dose of medicines or if having cognitive  issues related to the current medications.Risk of overdose and death, if medicines not taken as prescribed, were also discussed. If the patient develops new symptoms or if the symptoms worsen, the patient should call the office.    While transcribing every attempt was made to maintain the accuracy of the note in terms of it's contents,there may have been some errors made inadvertently.  Thank you for allowing me to participate in the care of this patient.    Trino Kimball MD.    Cc: Mirtha Scott MD    I, Marguerite Brooks, am scribing for and in the presence of Dr. Trino Kimball.   04/03/24  ELIZABETH Sanches, Dr. Trino Kimball, personally performed the services described in this documentation as scribed by   Marguerite Brooks MA  and it is both accurate and complete

## 2024-06-25 ENCOUNTER — OFFICE VISIT (OUTPATIENT)
Dept: PAIN MANAGEMENT | Age: 49
End: 2024-06-25

## 2024-06-25 VITALS
SYSTOLIC BLOOD PRESSURE: 119 MMHG | HEART RATE: 77 BPM | WEIGHT: 234 LBS | DIASTOLIC BLOOD PRESSURE: 78 MMHG | BODY MASS INDEX: 30.87 KG/M2 | OXYGEN SATURATION: 95 %

## 2024-06-25 DIAGNOSIS — M75.40 IMPINGEMENT SYNDROME OF SHOULDER, UNSPECIFIED LATERALITY: ICD-10-CM

## 2024-06-25 DIAGNOSIS — S13.9XXA SPRAIN OF NECK, INITIAL ENCOUNTER: ICD-10-CM

## 2024-06-25 RX ORDER — DICLOFENAC SODIUM 75 MG/1
75 TABLET, DELAYED RELEASE ORAL DAILY PRN
Qty: 30 TABLET | Refills: 2 | Status: SHIPPED | OUTPATIENT
Start: 2024-06-25

## 2024-06-25 NOTE — PROGRESS NOTES
Kishan Dinh   1975  [id number]      HISTORY OF PRESENT ILLNESS:  Mr. Dinh is a 48 y.o. male returns for a follow up visit for pain management  He has a diagnosis of   1. BWC-Impingement syndrome of shoulder, laterality    2. BWC-Sprain of neck, initial encounter    .      As per information/history obtained from the PADT(patient assessment and documentation tool) -  He complains of pain in the neck,upper back, mid back, and lower back with radiation to the shoulders Right, arms Right, and elbows Right He rates the pain 8/10 and describes it as aching, numbness.  Pain is made worse by: nothing, movement, walking, standing, sitting, bending, lifting. He denies any side effects from the current pain regimen. Patient reports that since last follow up visit the physical functioning is unchanged, family/social relationships are unchanged, mood is unchanged sleep patterns are better. Mr. Dinh states that since starting the treatment with the current regimen the  overall functioning  in the above aspects is  better, Patient denies misusing/abusing his narcotic pain medications or using any illegal drugs.  There are No indicators for possible drug abuse, addiction or diversion problems. Upon obtaining the medical history from Mr. Dinh regarding today's office visit for his presenting problems, patient states he has been doing fair. Mr. Dinh reports he is working full time. He mentions he is using Voltaren 75 mg daily PRN. Patient reports he is managing his ADL's and house chores. Patient states the pain is worse in the neck than the back, lately his back has been hurting.       ALLERGIES: Patients list of allergies were reviewed     MEDICATIONS: Mr. Dinh list of medications were reviewed.His current medications are   Outpatient Medications Prior to Visit   Medication Sig Dispense Refill    tadalafil (CIALIS) 5 MG tablet Take 1 tablet by mouth as needed for Erectile Dysfunction      atorvastatin

## 2024-08-20 ENCOUNTER — OFFICE VISIT (OUTPATIENT)
Dept: PAIN MANAGEMENT | Age: 49
End: 2024-08-20

## 2024-08-20 VITALS
SYSTOLIC BLOOD PRESSURE: 121 MMHG | OXYGEN SATURATION: 97 % | DIASTOLIC BLOOD PRESSURE: 73 MMHG | HEART RATE: 73 BPM | BODY MASS INDEX: 30.61 KG/M2 | WEIGHT: 232 LBS

## 2024-08-20 DIAGNOSIS — M75.40 IMPINGEMENT SYNDROME OF SHOULDER, UNSPECIFIED LATERALITY: ICD-10-CM

## 2024-08-20 DIAGNOSIS — S13.9XXA SPRAIN OF NECK, INITIAL ENCOUNTER: ICD-10-CM

## 2024-08-20 RX ORDER — DICLOFENAC SODIUM 75 MG/1
75 TABLET, DELAYED RELEASE ORAL DAILY PRN
Qty: 30 TABLET | Refills: 2 | Status: SHIPPED | OUTPATIENT
Start: 2024-08-20

## 2024-08-20 NOTE — PROGRESS NOTES
Kishan Dinh   1975  [id number]      HISTORY OF PRESENT ILLNESS:  Mr. Dinh is a 49 y.o. male returns for a follow up visit for pain management  He has a diagnosis of   1. BWC-Impingement syndrome of shoulder, laterality    2. BWC-Sprain of neck, initial encounter    .      As per information/history obtained from the PADT(patient assessment and documentation tool) -  He complains of pain in the neck, upper back, mid back, and lower back with radiation to the shoulders Right, arms Right, and elbows Right He rates the pain 8/10 and describes it as aching, numbness.  Pain is made worse by: nothing, movement, walking, standing, sitting, bending, lifting. He denies any side effects from the current pain regimen. Patient reports that since last follow up visit the physical functioning is unchanged, family/social relationships are unchanged, mood is unchanged sleep patterns are unchanged. Mr. Dinh states that since starting the treatment with the current regimen the  overall functioning  in the above aspects is  better, Patient denies misusing/abusing his narcotic pain medications or using any illegal drugs.  There are No indicators for possible drug abuse, addiction or diversion problems. Upon obtaining the medical history from Mr. Dinh regarding today's office visit for his presenting problems, patient states  she has been doing fair, working full time. He says his claim was approved for his mid back also. He mentions he's using Voltaren as needed along with Tylenol as needed. He reports he's managing his ADLS. He complains his mid back and shoulder hurts the most.       ALLERGIES: Patients list of allergies were reviewed     MEDICATIONS: Mr. Dinh list of medications were reviewed.His current medications are   Outpatient Medications Prior to Visit   Medication Sig Dispense Refill    diclofenac (VOLTAREN) 75 MG EC tablet Take 1 tablet by mouth daily as needed for Pain 30 tablet 2    tadalafil  be rechecked as part of office protocol.    -Continue with Voltaren 75 mg PRN   -He was advised to increase fluids ( 5-7  glasses of fluid daily), limit caffeine, avoid cheese products, increase dietary fiber, increase activity and exercise as tolerated and relax regularly and enjoy meals   -Neck and Shoulder ROM exercises as advised    Current Outpatient Medications   Medication Sig Dispense Refill    diclofenac (VOLTAREN) 75 MG EC tablet Take 1 tablet by mouth daily as needed for Pain 30 tablet 2    tadalafil (CIALIS) 5 MG tablet Take 1 tablet by mouth as needed for Erectile Dysfunction      atorvastatin (LIPITOR) 10 MG tablet       metFORMIN (GLUCOPHAGE) 500 MG tablet Take 1 tablet by mouth 2 times daily (with meals)      esomeprazole (NEXIUM) 40 MG delayed release capsule Take 1 capsule by mouth every morning (before breakfast)       No current facility-administered medications for this visit.       General Goals of current treatment regimen include improvement in pain, restoration of functioning- with focus on improvement in physical performance, general activity, work or disability,emotional distress, health care utilization and  decreased medication consumption.   Will continue to monitor progress towards achieving/maintaining therapeutic goals with special emphasis on  1. Improvement in perceived interfernce  of pain with ADL's. Ability to do home exercises independently. Ability to do household chores indoor and/or outdoor work and social and leisure activities.Improve psychosocial and physical functioning. - he is maintaining his treatment goal with the current regimen.    2. Ability to focus/concentrate at work and perform the duties required of him at work  Sit through a workday without lower extremity symptoms.  Stand 30-60 minutes without lower extremity symptoms.  Ability to lift up to 10-20 lbs. Ability to go up and down stairs.  Sit 30-60 minutes  Without having to stand up frequently. - he is

## 2024-11-12 ENCOUNTER — OFFICE VISIT (OUTPATIENT)
Dept: PAIN MANAGEMENT | Age: 49
End: 2024-11-12

## 2024-11-12 VITALS
BODY MASS INDEX: 30.56 KG/M2 | DIASTOLIC BLOOD PRESSURE: 78 MMHG | HEART RATE: 85 BPM | SYSTOLIC BLOOD PRESSURE: 122 MMHG | OXYGEN SATURATION: 97 % | WEIGHT: 231.6 LBS

## 2024-11-12 DIAGNOSIS — S13.9XXA SPRAIN OF NECK, INITIAL ENCOUNTER: ICD-10-CM

## 2024-11-12 DIAGNOSIS — G89.4 CHRONIC PAIN SYNDROME: ICD-10-CM

## 2024-11-12 DIAGNOSIS — M75.40 IMPINGEMENT SYNDROME OF SHOULDER, UNSPECIFIED LATERALITY: ICD-10-CM

## 2024-11-12 RX ORDER — DICLOFENAC SODIUM 75 MG/1
75 TABLET, DELAYED RELEASE ORAL DAILY PRN
Qty: 30 TABLET | Refills: 2 | Status: SHIPPED | OUTPATIENT
Start: 2024-11-12

## 2024-11-12 NOTE — PROGRESS NOTES
Kishan Dinh   1975  [id number]      HISTORY OF PRESENT ILLNESS:  Mr. Dinh is a 49 y.o. male returns for a follow up visit for pain management  He has a diagnosis of   1. BWC-Impingement syndrome of shoulder, laterality    2. BWC-Sprain of neck, initial encounter    .      As per information/history obtained from the PADT(patient assessment and documentation tool) -  He complains of pain in the neck, upper back, mid back, and lower back with radiation to the shoulders Right, arms Right, and elbows Right He rates the pain 7/10 and describes it as aching, numbness.  Pain is made worse by: movement, walking, standing, sitting, bending, lifting. He denies any side effects from the current pain regimen. Patient reports that since last follow up visit the physical functioning is unchanged, family/social relationships are unchanged, mood is unchanged sleep patterns are unchanged. Mr. Dinh states that since starting the treatment with the current regimen the  overall functioning  in the above aspects is  better, Patient denies misusing/abusing his narcotic pain medications or using any illegal drugs.  There are No indicators for possible drug abuse, addiction or diversion problems.   Upon obtaining the medical history from Mr. Dinh regarding today's office visit for his presenting problems, patient states he has been doing fair, he is managing with the medications. Mr. Dinh reports he is working 12 hour shifts. He mentions he is using Voltaren as needed only. Patient denies any constipation symptoms.       ALLERGIES: Patients list of allergies were reviewed     MEDICATIONS: Mr. Dinh list of medications were reviewed.His current medications are   Outpatient Medications Prior to Visit   Medication Sig Dispense Refill    diclofenac (VOLTAREN) 75 MG EC tablet Take 1 tablet by mouth daily as needed for Pain 30 tablet 2    tadalafil (CIALIS) 5 MG tablet Take 1 tablet by mouth as needed for Erectile

## 2025-02-04 ENCOUNTER — OFFICE VISIT (OUTPATIENT)
Dept: PAIN MANAGEMENT | Age: 50
End: 2025-02-04

## 2025-02-04 VITALS
DIASTOLIC BLOOD PRESSURE: 81 MMHG | HEART RATE: 76 BPM | OXYGEN SATURATION: 97 % | SYSTOLIC BLOOD PRESSURE: 121 MMHG | BODY MASS INDEX: 31.16 KG/M2 | WEIGHT: 236.2 LBS

## 2025-02-04 DIAGNOSIS — M75.40 IMPINGEMENT SYNDROME OF SHOULDER, UNSPECIFIED LATERALITY: ICD-10-CM

## 2025-02-04 DIAGNOSIS — G89.4 CHRONIC PAIN SYNDROME: ICD-10-CM

## 2025-02-04 DIAGNOSIS — S13.9XXA SPRAIN OF NECK, INITIAL ENCOUNTER: ICD-10-CM

## 2025-02-04 RX ORDER — DICLOFENAC SODIUM 75 MG/1
75 TABLET, DELAYED RELEASE ORAL DAILY PRN
Qty: 30 TABLET | Refills: 2 | Status: SHIPPED | OUTPATIENT
Start: 2025-02-04

## 2025-02-04 NOTE — PROGRESS NOTES
Kishan Dinh   1975  [id number]      HISTORY OF PRESENT ILLNESS:  Mr. Dinh is a 49 y.o. male returns for a follow up visit for pain management  He has a diagnosis of   1. BWC-Impingement syndrome of shoulder, laterality    2. BWC-Sprain of neck, initial encounter    3. Chronic pain syndrome    .      As per information/history obtained from the PADT(patient assessment and documentation tool) -  He complains of pain in the neck, upper back, mid back, and lower back with radiation to the shoulders Right, arms Right, elbows Right, and hands Right He rates the pain 8/10 and describes it as sharp, aching, numbness.  Pain is made worse by: movement, walking, standing, sitting, bending, lifting. He denies any side effects from the current pain regimen. Patient reports that since last follow up visit the physical functioning is unchanged, family/social relationships are unchanged, mood is unchanged sleep patterns are unchanged. Mr. Dinh states that since starting the treatment with the current regimen the  overall functioning  in the above aspects is  better, Patient denies misusing/abusing his narcotic pain medications or using any illegal drugs.  There are No indicators for possible drug abuse, addiction or diversion problems.   Upon obtaining the medical history from Mr. Dinh regarding today's office visit for his presenting problems, patient reports he has been doing fair, managing with the medication. He complains his neck/back and shoulder hurts. 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 He says he's using Voltaren gel along with Voltaren as needed. He denies any GERD symptoms.       ALLERGIES: Patients list of allergies were reviewed     MEDICATIONS: Mr. Dinh list of medications were reviewed.His current medications are   Outpatient Medications Prior to Visit   Medication Sig Dispense Refill    diclofenac (VOLTAREN) 75 MG EC tablet Take 1 tablet by mouth daily as needed for Pain 30

## 2025-04-29 ENCOUNTER — OFFICE VISIT (OUTPATIENT)
Dept: PAIN MANAGEMENT | Age: 50
End: 2025-04-29

## 2025-04-29 VITALS
HEART RATE: 77 BPM | DIASTOLIC BLOOD PRESSURE: 78 MMHG | OXYGEN SATURATION: 94 % | BODY MASS INDEX: 30.34 KG/M2 | SYSTOLIC BLOOD PRESSURE: 122 MMHG | WEIGHT: 230 LBS

## 2025-04-29 DIAGNOSIS — G89.4 CHRONIC PAIN SYNDROME: ICD-10-CM

## 2025-04-29 DIAGNOSIS — S13.9XXA SPRAIN OF NECK, INITIAL ENCOUNTER: ICD-10-CM

## 2025-04-29 DIAGNOSIS — M75.40 IMPINGEMENT SYNDROME OF SHOULDER, UNSPECIFIED LATERALITY: ICD-10-CM

## 2025-04-29 RX ORDER — DICLOFENAC SODIUM 75 MG/1
75 TABLET, DELAYED RELEASE ORAL DAILY PRN
Qty: 30 TABLET | Refills: 2 | Status: SHIPPED | OUTPATIENT
Start: 2025-04-29

## 2025-04-29 NOTE — PROGRESS NOTES
Kishan Dinh   1975  [id number]      HISTORY OF PRESENT ILLNESS:  Mr. Dinh is a 49 y.o. male returns for a follow up visit for pain management  He has a diagnosis of   1. BWC-Impingement syndrome of shoulder, laterality    2. BWC-Sprain of neck, initial encounter    3. Chronic pain syndrome    .      As per information/history obtained from the PADT(patient assessment and documentation tool) -  He complains of pain in the neck, upper back, mid back, and lower back with radiation to the shoulders Right, arms Right, elbows Right, and hands Right He rates the pain 7/10 and describes it as aching, numbness.  Pain is made worse by: movement, walking, standing, sitting, bending, lifting. He denies any side effects from the current pain regimen. Patient reports that since last follow up visit the physical functioning is unchanged, family/social relationships are unchanged, mood is unchanged sleep patterns are unchanged. Mr. Dinh states that since starting the treatment with the current regimen the  overall functioning  in the above aspects is  better, Patient denies misusing/abusing his narcotic pain medications or using any illegal drugs.  There are No indicators for possible drug abuse, addiction or diversion problems.   Upon obtaining the medical history from Mr. Dinh regarding today's office visit for his presenting problems, patient states he has been doing fair, managing okay with the medications. He says he's using Voltaren as needed. He mentions he's has no started the gel yet. He mentions he's working full time. He denies any GERD symptoms.       ALLERGIES: Patients list of allergies were reviewed     MEDICATIONS: Mr. Dinh list of medications were reviewed.His current medications are   Outpatient Medications Prior to Visit   Medication Sig Dispense Refill    diclofenac (VOLTAREN) 75 MG EC tablet Take 1 tablet by mouth daily as needed for Pain 30 tablet 2    diclofenac sodium (VOLTAREN) 1 %

## 2025-07-22 ENCOUNTER — OFFICE VISIT (OUTPATIENT)
Dept: PAIN MANAGEMENT | Age: 50
End: 2025-07-22
Payer: COMMERCIAL

## 2025-07-22 VITALS
OXYGEN SATURATION: 96 % | BODY MASS INDEX: 31.14 KG/M2 | HEART RATE: 72 BPM | HEIGHT: 73 IN | WEIGHT: 235 LBS | SYSTOLIC BLOOD PRESSURE: 136 MMHG | DIASTOLIC BLOOD PRESSURE: 84 MMHG

## 2025-07-22 DIAGNOSIS — S13.9XXA SPRAIN OF NECK, INITIAL ENCOUNTER: ICD-10-CM

## 2025-07-22 DIAGNOSIS — G89.4 CHRONIC PAIN SYNDROME: ICD-10-CM

## 2025-07-22 DIAGNOSIS — M75.40 IMPINGEMENT SYNDROME OF SHOULDER, UNSPECIFIED LATERALITY: ICD-10-CM

## 2025-07-22 PROCEDURE — 99213 OFFICE O/P EST LOW 20 MIN: CPT | Performed by: INTERNAL MEDICINE

## 2025-07-22 RX ORDER — DICLOFENAC SODIUM 75 MG/1
75 TABLET, DELAYED RELEASE ORAL DAILY PRN
Qty: 30 TABLET | Refills: 2 | Status: SHIPPED | OUTPATIENT
Start: 2025-07-22

## 2025-07-22 NOTE — PROGRESS NOTES
Kishan Dinh   1975  [id number]      HISTORY OF PRESENT ILLNESS:  Mr. Dinh is a 50 y.o. male returns for a follow up visit for pain management  He has a diagnosis of   1. Chronic pain syndrome    2. BWC-Sprain of neck, initial encounter    3. BWC-Impingement syndrome of shoulder, laterality    .      As per information/history obtained from the PADT(patient assessment and documentation tool) -  He complains of pain in the neck, upper back, mid back, and lower back with radiation to the shoulders Right, arms Right, and elbows Right He rates the pain 7/10 and describes it as aching, numbness.  Pain is made worse by: movement, walking, standing, sitting, bending, lifting. He denies any side effects from the current pain regimen. Patient reports that since last follow up visit the physical functioning is unchanged, family/social relationships are unchanged, mood is unchanged sleep patterns are unchanged. Mr. Dinh states that since starting the treatment with the current regimen the  overall functioning  in the above aspects is  better, Patient denies misusing/abusing his narcotic pain medications or using any illegal drugs.  There are No indicators for possible drug abuse, addiction or diversion problems.   Upon obtaining the medical history from Mr. Dinh regarding today's office visit for his presenting problems, patient states he has been doing okay. Mr. Dinh reports he is working full time, he is working up to 60 hours a week. Patient complains of his shoulder hurting the most. He mentions he is using Voltaren as needed. Patient denies any side effects.     ALLERGIES: Patients list of allergies were reviewed     MEDICATIONS: Mr. Dinh list of medications were reviewed.His current medications are   Outpatient Medications Prior to Visit   Medication Sig Dispense Refill    diclofenac sodium (VOLTAREN) 1 % GEL Apply 2-4 grams to the affected area BID  g 2    diclofenac (VOLTAREN) 75 MG

## (undated) DEVICE — ENDOSCOPY KIT: Brand: MEDLINE INDUSTRIES, INC.

## (undated) DEVICE — BITE BLOCK ENDOSCP AD 60 FR W/ ADJ STRP PLAS GRN BLOX

## (undated) DEVICE — FORCEPS BX 240CM 2.4MM L NDL RAD JAW 4 M00513334

## (undated) DEVICE — FORMALIN CLEAR VIAL 20 ML 10%